# Patient Record
Sex: FEMALE | Race: WHITE | NOT HISPANIC OR LATINO | Employment: FULL TIME | URBAN - METROPOLITAN AREA
[De-identification: names, ages, dates, MRNs, and addresses within clinical notes are randomized per-mention and may not be internally consistent; named-entity substitution may affect disease eponyms.]

---

## 2023-06-07 ENCOUNTER — TELEPHONE (OUTPATIENT)
Dept: PRIMARY CARE | Facility: CLINIC | Age: 38
End: 2023-06-07
Payer: COMMERCIAL

## 2023-06-07 DIAGNOSIS — J01.00 ACUTE NON-RECURRENT MAXILLARY SINUSITIS: Primary | ICD-10-CM

## 2023-06-07 RX ORDER — AMOXICILLIN AND CLAVULANATE POTASSIUM 875; 125 MG/1; MG/1
875 TABLET, FILM COATED ORAL 2 TIMES DAILY
Qty: 14 TABLET | Refills: 0 | Status: SHIPPED | OUTPATIENT
Start: 2023-06-07 | End: 2023-06-14

## 2023-06-07 RX ORDER — FLUCONAZOLE 150 MG/1
150 TABLET ORAL ONCE
Qty: 1 TABLET | Refills: 0 | Status: SHIPPED | OUTPATIENT
Start: 2023-06-07 | End: 2023-06-07

## 2023-06-07 NOTE — TELEPHONE ENCOUNTER
Pt contacted me.   Sinus congestion and pain x 2 wk. Now w eye drainage.   Rx augmentin BID x 7 d   Diflucan in case of yeast

## 2023-06-19 ENCOUNTER — TELEPHONE (OUTPATIENT)
Dept: PRIMARY CARE | Facility: CLINIC | Age: 38
End: 2023-06-19
Payer: COMMERCIAL

## 2023-06-19 DIAGNOSIS — Z00.00 LABORATORY TESTS ORDERED AS PART OF A COMPLETE PHYSICAL EXAM (CPE): ICD-10-CM

## 2023-06-29 ENCOUNTER — APPOINTMENT (OUTPATIENT)
Dept: PRIMARY CARE | Facility: CLINIC | Age: 38
End: 2023-06-29
Payer: COMMERCIAL

## 2023-07-24 ENCOUNTER — OFFICE VISIT (OUTPATIENT)
Dept: PRIMARY CARE | Facility: CLINIC | Age: 38
End: 2023-07-24
Payer: COMMERCIAL

## 2023-07-24 VITALS
SYSTOLIC BLOOD PRESSURE: 125 MMHG | RESPIRATION RATE: 18 BRPM | WEIGHT: 182.2 LBS | HEIGHT: 61 IN | BODY MASS INDEX: 34.4 KG/M2 | HEART RATE: 70 BPM | OXYGEN SATURATION: 97 % | TEMPERATURE: 98 F | DIASTOLIC BLOOD PRESSURE: 82 MMHG

## 2023-07-24 DIAGNOSIS — J45.20 MILD INTERMITTENT ASTHMA WITHOUT COMPLICATION (HHS-HCC): ICD-10-CM

## 2023-07-24 DIAGNOSIS — Z00.00 ENCOUNTER FOR ANNUAL PHYSICAL EXAM: Primary | ICD-10-CM

## 2023-07-24 PROCEDURE — 1036F TOBACCO NON-USER: CPT | Performed by: FAMILY MEDICINE

## 2023-07-24 PROCEDURE — 99395 PREV VISIT EST AGE 18-39: CPT | Performed by: FAMILY MEDICINE

## 2023-07-24 PROCEDURE — 3008F BODY MASS INDEX DOCD: CPT | Performed by: FAMILY MEDICINE

## 2023-07-24 RX ORDER — PHENTERMINE HYDROCHLORIDE 37.5 MG/1
37.5 TABLET ORAL
Qty: 30 TABLET | Refills: 0 | Status: SHIPPED | OUTPATIENT
Start: 2023-07-24 | End: 2023-08-02 | Stop reason: SDUPTHER

## 2023-07-24 RX ORDER — ALBUTEROL SULFATE 90 UG/1
AEROSOL, METERED RESPIRATORY (INHALATION) AS NEEDED
COMMUNITY
Start: 2019-03-06 | End: 2024-02-07 | Stop reason: SDUPTHER

## 2023-07-24 RX ORDER — BUDESONIDE AND FORMOTEROL FUMARATE DIHYDRATE 80; 4.5 UG/1; UG/1
2 AEROSOL RESPIRATORY (INHALATION) 2 TIMES DAILY
Qty: 3 EACH | Refills: 3 | Status: SHIPPED | OUTPATIENT
Start: 2023-07-24 | End: 2023-07-24

## 2023-07-24 RX ORDER — LORATADINE 10 MG/1
1 TABLET ORAL AS NEEDED
COMMUNITY
Start: 2007-04-26

## 2023-07-24 RX ORDER — FLUCONAZOLE 150 MG/1
150 TABLET ORAL ONCE
COMMUNITY
Start: 2023-06-07

## 2023-07-24 NOTE — PROGRESS NOTES
"Subjective   Lety Lynn is a 37 y.o. female who presents for Annual Exam (Pt being seen for complete physical exam.).  HPI  Pap NIL HPV neg 1/2023    Here for CPE  Ex regularly and eats healthy, very frustrated that she's unable to lose weight  Mood is good, sleeping well   Fhx of colon ca in grandfather in 50-60s.   No immed Fhx breast ca   Needs RF On symbicort   Current Outpatient Medications on File Prior to Visit   Medication Sig Dispense Refill    albuterol 90 mcg/actuation inhaler Inhale if needed.      fluconazole (Diflucan) 150 mg tablet Take 1 tablet (150 mg) by mouth 1 time. FOR ONE DOSE      loratadine (Claritin) 10 mg tablet Take 1 tablet (10 mg) by mouth if needed.      [DISCONTINUED] budesonide-formoteroL (Symbicort) 80-4.5 mcg/actuation inhaler INHALE 2 PUFFS BY MOUTH TWO TIMES A DAY. RINSE MOUTH AFTER USE 10.2 g 11     No current facility-administered medications on file prior to visit.                  Objective   /82   Pulse 70   Temp 36.7 °C (98 °F)   Resp 18   Ht 1.549 m (5' 0.98\")   Wt 82.6 kg (182 lb 3.2 oz)   SpO2 97%   BMI 34.44 kg/m²    Physical Exam  General: NAD  HEENT:NCAT, PERRLA, nml OP  Neck: no cervical NADINE  Heart: RRR no murmur, no edema   Lungs: CTA b/l, no wheeze or rhonchi   GI: abd soft, nontender, nondistended.   MSK: no c/c/e. nml gait   Skin: warm and dry  Psych: cooperative, appropriate affect  Neuro: speech clear. A&Ox3  Assessment/Plan   Problem List Items Addressed This Visit    None  Visit Diagnoses       Encounter for annual physical exam    CPE:overall doing well. Discussed diet/ex changes  BMI: 34  VACC: reviewed, tdap, PNA vacc UTD   COLON CA SCRN: 45  LABS: ordered TSH. Reviewed life ins labs  PAP: UTD per GYN   MAMMO: 40  DEXA: 65      Mild intermittent asthma without complication      Stable RF symbicort x 1 yr     Relevant Medications    budesonide-formoteroL (Symbicort) 80-4.5 mcg/actuation inhaler    BMI 34.0-34.9,adult      -failed " diet/ex changes  -start phentermine 37.5 take 1/2 tab x 1 wk   -considered GLP1  but not covered under ins  -RF to nutritionist   -f.up 1 mo  -CSA today  -I have personally reviewed the OARRS report for the patient. This report is scanned into the electronic medical record. I have considered the risks of abuse, dependence, addiction and diversion.       Relevant Medications    phentermine (Adipex-P) 37.5 mg tablet    Other Relevant Orders    Referral to Nutrition Services

## 2023-07-31 ENCOUNTER — LAB (OUTPATIENT)
Dept: LAB | Facility: LAB | Age: 38
End: 2023-07-31
Payer: COMMERCIAL

## 2023-07-31 DIAGNOSIS — Z00.00 ENCOUNTER FOR ANNUAL PHYSICAL EXAM: ICD-10-CM

## 2023-07-31 PROCEDURE — 36415 COLL VENOUS BLD VENIPUNCTURE: CPT

## 2023-07-31 PROCEDURE — 84443 ASSAY THYROID STIM HORMONE: CPT

## 2023-08-01 ENCOUNTER — TELEPHONE (OUTPATIENT)
Dept: PRIMARY CARE | Facility: CLINIC | Age: 38
End: 2023-08-01
Payer: COMMERCIAL

## 2023-08-01 LAB — THYROTROPIN (MIU/L) IN SER/PLAS BY DETECTION LIMIT <= 0.05 MIU/L: 1.99 MIU/L (ref 0.44–3.98)

## 2023-08-02 RX ORDER — PHENTERMINE HYDROCHLORIDE 37.5 MG/1
37.5 TABLET ORAL
Qty: 30 TABLET | Refills: 0 | Status: SHIPPED | OUTPATIENT
Start: 2023-08-02 | End: 2023-08-30 | Stop reason: SDUPTHER

## 2023-08-02 NOTE — TELEPHONE ENCOUNTER
Spoke with Pt today and informed of lab results, Pt requested phentermine (Adipex-P) 37.5 mg tablet be sent to [facility] as [facility] does not carry this medication; medication and pharmacy pended to this encounter

## 2023-08-02 NOTE — TELEPHONE ENCOUNTER
Spoke with Pt informed of normal thyroid and to repeat in one year per Dr. Behm Pt expressed understanding.

## 2023-08-18 PROBLEM — Z86.59 HISTORY OF BULIMIA: Status: ACTIVE | Noted: 2023-08-18

## 2023-08-18 PROBLEM — L08.9 PUSTULE: Status: ACTIVE | Noted: 2023-08-18

## 2023-08-18 PROBLEM — G25.81 RESTLESS LEG SYNDROME: Status: ACTIVE | Noted: 2023-08-18

## 2023-08-18 PROBLEM — D25.9 FIBROID UTERUS: Status: ACTIVE | Noted: 2023-08-18

## 2023-08-18 PROBLEM — J30.2 SEASONAL ALLERGIES: Status: ACTIVE | Noted: 2023-08-18

## 2023-08-18 PROBLEM — J45.909 ASTHMA (HHS-HCC): Status: ACTIVE | Noted: 2023-08-18

## 2023-08-18 PROBLEM — R31.9 HEMATURIA: Status: ACTIVE | Noted: 2023-08-18

## 2023-08-18 PROBLEM — R39.9 UTI SYMPTOMS: Status: ACTIVE | Noted: 2023-08-18

## 2023-08-18 PROBLEM — N20.0 RENAL CALCULUS: Status: ACTIVE | Noted: 2023-08-18

## 2023-08-18 PROBLEM — O09.529 AMA (ADVANCED MATERNAL AGE) MULTIGRAVIDA 35+ (HHS-HCC): Status: ACTIVE | Noted: 2023-08-18

## 2023-08-18 PROBLEM — R79.89 LOW VITAMIN D LEVEL: Status: ACTIVE | Noted: 2023-08-18

## 2023-08-18 PROBLEM — F32.A DEPRESSION: Status: ACTIVE | Noted: 2023-08-18

## 2023-08-18 RX ORDER — CHOLECALCIFEROL (VITAMIN D3) 125 MCG
125 CAPSULE ORAL
COMMUNITY

## 2023-08-18 RX ORDER — ALBUTEROL SULFATE 5 MG/ML
SOLUTION RESPIRATORY (INHALATION)
COMMUNITY
Start: 2010-07-03

## 2023-08-30 ENCOUNTER — OFFICE VISIT (OUTPATIENT)
Dept: PRIMARY CARE | Facility: CLINIC | Age: 38
End: 2023-08-30
Payer: COMMERCIAL

## 2023-08-30 VITALS
HEIGHT: 61 IN | DIASTOLIC BLOOD PRESSURE: 86 MMHG | RESPIRATION RATE: 18 BRPM | HEART RATE: 82 BPM | BODY MASS INDEX: 32.7 KG/M2 | WEIGHT: 173.2 LBS | SYSTOLIC BLOOD PRESSURE: 134 MMHG | OXYGEN SATURATION: 98 % | TEMPERATURE: 98 F

## 2023-08-30 PROCEDURE — 3008F BODY MASS INDEX DOCD: CPT | Performed by: FAMILY MEDICINE

## 2023-08-30 PROCEDURE — 1036F TOBACCO NON-USER: CPT | Performed by: FAMILY MEDICINE

## 2023-08-30 PROCEDURE — 99213 OFFICE O/P EST LOW 20 MIN: CPT | Performed by: FAMILY MEDICINE

## 2023-08-30 RX ORDER — PHENTERMINE HYDROCHLORIDE 37.5 MG/1
37.5 TABLET ORAL
Qty: 30 TABLET | Refills: 0 | Status: SHIPPED | OUTPATIENT
Start: 2023-08-30 | End: 2023-09-28 | Stop reason: SDUPTHER

## 2023-08-30 NOTE — PROGRESS NOTES
"Subjective   Lety Lynn is a 37 y.o. female who presents for Follow-up (Medication follow up ).  HPI  Started phentermine last mo. Lost 9 lb. Dec appetite and portions. Seeing dietician in oct   Current Outpatient Medications on File Prior to Visit   Medication Sig Dispense Refill    albuterol 5 mg/mL nebulizer solution aerosol treatment q 4 to 6 hours as needed for cough, wheeze      albuterol 90 mcg/actuation inhaler Inhale if needed.      budesonide-formoteroL (Symbicort) 80-4.5 mcg/actuation inhaler Inhale 2 puffs 2 times a day. RINSE MOUTH AFTER USE 3 each 3    cholecalciferol (Vitamin D-3) 125 MCG (5000 UT) capsule Take 1 capsule (125 mcg) by mouth 1 (one) time per week.      fluconazole (Diflucan) 150 mg tablet Take 1 tablet (150 mg) by mouth 1 time. FOR ONE DOSE      loratadine (Claritin) 10 mg tablet Take 1 tablet (10 mg) by mouth if needed.      mometasone-formoterol (Dulera 100) 100-5 mcg/actuation inhaler Inhale 2 puffs twice a day. As instructed      [DISCONTINUED] phentermine (Adipex-P) 37.5 mg tablet Take 1 tablet (37.5 mg) by mouth once daily in the morning. Take before meals. 30 tablet 0     No current facility-administered medications on file prior to visit.                  Objective   /86   Pulse 82   Temp 36.7 °C (98 °F)   Resp 18   Ht 1.549 m (5' 0.98\")   Wt 78.6 kg (173 lb 3.2 oz)   SpO2 98%   BMI 32.74 kg/m²    Physical Exam  General: NAD  HEENT:NCAT, PERRLA  Heart: RRR no murmur, no edema   Lungs: CTA b/l, no wheeze or rhonchi   MSK: no c/c/e. nml gait   Skin: warm and dry  Psych: cooperative, appropriate affect  Neuro: speech clear. A&Ox3  Assessment/Plan   Problem List Items Addressed This Visit    None  Visit Diagnoses       BMI 34.0-34.9,adult      -making great progress w phentermine  -RF x mo #2  -CSA UTD  -f/up 1 mo  -I have personally reviewed the OARRS report for the patient. This report is scanned into the electronic medical record. I have considered the risks " of abuse, dependence, addiction and diversion.       Relevant Medications    phentermine (Adipex-P) 37.5 mg tablet

## 2023-09-28 ENCOUNTER — TELEPHONE (OUTPATIENT)
Dept: PRIMARY CARE | Facility: CLINIC | Age: 38
End: 2023-09-28
Payer: COMMERCIAL

## 2023-09-28 RX ORDER — PHENTERMINE HYDROCHLORIDE 37.5 MG/1
37.5 TABLET ORAL
Qty: 7 TABLET | Refills: 0 | Status: SHIPPED | OUTPATIENT
Start: 2023-09-28 | End: 2023-10-04 | Stop reason: SDUPTHER

## 2023-09-28 NOTE — TELEPHONE ENCOUNTER
Patient called in to get set up for apt, asked for an apt Monday, also will be out of medication on Monday, Advise?

## 2023-10-04 ENCOUNTER — OFFICE VISIT (OUTPATIENT)
Dept: PRIMARY CARE | Facility: CLINIC | Age: 38
End: 2023-10-04
Payer: COMMERCIAL

## 2023-10-04 VITALS
HEIGHT: 61 IN | HEART RATE: 81 BPM | WEIGHT: 163 LBS | DIASTOLIC BLOOD PRESSURE: 87 MMHG | SYSTOLIC BLOOD PRESSURE: 120 MMHG | TEMPERATURE: 98.2 F | RESPIRATION RATE: 18 BRPM | BODY MASS INDEX: 30.78 KG/M2 | OXYGEN SATURATION: 98 %

## 2023-10-04 DIAGNOSIS — Z23 FLU VACCINE NEED: ICD-10-CM

## 2023-10-04 PROCEDURE — 1036F TOBACCO NON-USER: CPT | Performed by: FAMILY MEDICINE

## 2023-10-04 PROCEDURE — 3008F BODY MASS INDEX DOCD: CPT | Performed by: FAMILY MEDICINE

## 2023-10-04 PROCEDURE — 90471 IMMUNIZATION ADMIN: CPT | Performed by: FAMILY MEDICINE

## 2023-10-04 PROCEDURE — 90686 IIV4 VACC NO PRSV 0.5 ML IM: CPT | Performed by: FAMILY MEDICINE

## 2023-10-04 PROCEDURE — 99213 OFFICE O/P EST LOW 20 MIN: CPT | Performed by: FAMILY MEDICINE

## 2023-10-04 RX ORDER — PHENTERMINE HYDROCHLORIDE 37.5 MG/1
37.5 TABLET ORAL
Qty: 30 TABLET | Refills: 0 | Status: SHIPPED | OUTPATIENT
Start: 2023-10-04 | End: 2023-11-03

## 2023-10-04 NOTE — PROGRESS NOTES
"Subjective   Lety Lynn is a 37 y.o. female who presents for Med Refill (Follow up on phentermine ).  HPI  Doing well on phentermine no SE, meeting w nutritionist soon to come up w proper eating plan.     Covid last week feeling better   Current Outpatient Medications on File Prior to Visit   Medication Sig Dispense Refill    albuterol 5 mg/mL nebulizer solution aerosol treatment q 4 to 6 hours as needed for cough, wheeze      albuterol 90 mcg/actuation inhaler Inhale if needed.      budesonide-formoteroL (Symbicort) 80-4.5 mcg/actuation inhaler INHALE 2 PUFFS TWO TIMES A DAY. RINSE MOUTH AFTER USE 30.6 g 3    cholecalciferol (Vitamin D-3) 125 MCG (5000 UT) capsule Take 1 capsule (125 mcg) by mouth 1 (one) time per week.      fluconazole (Diflucan) 150 mg tablet Take 1 tablet (150 mg) by mouth 1 time. FOR ONE DOSE      loratadine (Claritin) 10 mg tablet Take 1 tablet (10 mg) by mouth if needed.      mometasone-formoterol (Dulera 100) 100-5 mcg/actuation inhaler Inhale 2 puffs twice a day. As instructed      [DISCONTINUED] phentermine (Adipex-P) 37.5 mg tablet Take 1 tablet (37.5 mg) by mouth once daily in the morning. Take before meals. 30 tablet 0    [DISCONTINUED] phentermine (Adipex-P) 37.5 mg tablet Take 1 tablet (37.5 mg) by mouth once daily in the morning. Take before meals for 7 days. 7 tablet 0     No current facility-administered medications on file prior to visit.                  Objective   /87   Pulse 81   Temp 36.8 °C (98.2 °F)   Resp 18   Ht 1.549 m (5' 0.98\")   Wt 73.9 kg (163 lb)   SpO2 98%   BMI 30.81 kg/m²    Physical Exam  General: NAD  HEENT:NCAT, PERRLA  Heart: RRR no murmur, no edema   Lungs: CTA b/l, no wheeze or rhonchi   MSK: no c/c/e. nml gait   Skin: warm and dry  Psych: cooperative, appropriate affect  Neuro: speech clear. A&Ox3  Assessment/Plan   Problem List Items Addressed This Visit    None  Visit Diagnoses       Flu vaccine need        Relevant Orders    Flu " vaccine (IIV4) age 6 months and greater, preservative free (Completed)    BMI 34.0-34.9,adult      -congratulated on success w phentermine  -RF for mo #3  -lost almost 20 lb in 2 mo!  -I have personally reviewed the OARRS report for the patient. This report is scanned into the electronic medical record. I have considered the risks of abuse, dependence, addiction and diversion.   -upcoming nutritionist appt  F/up yearly or prn     Relevant Medications    phentermine (Adipex-P) 37.5 mg tablet

## 2023-10-27 ENCOUNTER — TELEMEDICINE CLINICAL SUPPORT (OUTPATIENT)
Dept: NUTRITION | Facility: CLINIC | Age: 38
End: 2023-10-27
Payer: COMMERCIAL

## 2023-10-27 VITALS — BODY MASS INDEX: 30.76 KG/M2 | HEIGHT: 61 IN | WEIGHT: 162.92 LBS

## 2023-10-27 DIAGNOSIS — E66.9 CLASS 1 OBESITY WITH BODY MASS INDEX (BMI) OF 34.0 TO 34.9 IN ADULT, UNSPECIFIED OBESITY TYPE, UNSPECIFIED WHETHER SERIOUS COMORBIDITY PRESENT: ICD-10-CM

## 2023-10-27 PROCEDURE — 97802 MEDICAL NUTRITION INDIV IN: CPT | Mod: 95 | Performed by: DIETITIAN, REGISTERED

## 2023-10-27 PROCEDURE — 97802 MEDICAL NUTRITION INDIV IN: CPT | Performed by: DIETITIAN, REGISTERED

## 2023-10-27 NOTE — PROGRESS NOTES
"Reason for Nutrition Visit:  Pt is a 38 y.o. female being seen at referred for BMI 34.0-34.9,adult (Z68.34) by  Brittany Behm, DO on Jul 24, 2023.                 1. Body mass index (BMI) of 34.0 to 34.9 in adult [Z68.34]        2. Class 1 obesity with body mass index (BMI) of 34.0 to 34.9 in adult, unspecified obesity type, unspecified whether serious comorbidity present [E66.9, Z68.34]             Past Medical Hx:  Patient Active Problem List   Diagnosis    AMA (advanced maternal age) multigravida 35+    Asthma    Depression    Fibroid uterus    Hematuria    Low vitamin D level    Renal calculus    Restless leg syndrome    Seasonal allergies    UTI symptoms    History of bulimia    Pustule        Current Outpatient Medications:     albuterol 5 mg/mL nebulizer solution, aerosol treatment q 4 to 6 hours as needed for cough, wheeze, Disp: , Rfl:     albuterol 90 mcg/actuation inhaler, Inhale if needed., Disp: , Rfl:     budesonide-formoteroL (Symbicort) 80-4.5 mcg/actuation inhaler, INHALE 2 PUFFS TWO TIMES A DAY. RINSE MOUTH AFTER USE, Disp: 30.6 g, Rfl: 3    cholecalciferol (Vitamin D-3) 125 MCG (5000 UT) capsule, Take 1 capsule (125 mcg) by mouth 1 (one) time per week., Disp: , Rfl:     fluconazole (Diflucan) 150 mg tablet, Take 1 tablet (150 mg) by mouth 1 time. FOR ONE DOSE, Disp: , Rfl:     loratadine (Claritin) 10 mg tablet, Take 1 tablet (10 mg) by mouth if needed., Disp: , Rfl:     mometasone-formoterol (Dulera 100) 100-5 mcg/actuation inhaler, Inhale 2 puffs twice a day. As instructed, Disp: , Rfl:     phentermine (Adipex-P) 37.5 mg tablet, Take 1 tablet (37.5 mg) by mouth once daily in the morning. Take before meals., Disp: 30 tablet, Rfl: 0     Anthropometrics:  Anthropometrics  Height: 154.9 cm (5' 0.98\")  Weight: 73.9 kg (162 lb 14.7 oz)  BMI (Calculated): 30.8   Weight change:    Significant Weight Change: No    Lab Results   Component Value Date    HGBA1C 5.0 05/04/2022    CHOL 207 (H) 05/04/2022    " "LDLF 136 (H) 05/04/2022    TRIG 56 05/04/2022    HDL 59.5 05/04/2022        Chemistry    Lab Results   Component Value Date/Time     05/04/2022 1218    K 4.6 05/04/2022 1218     05/04/2022 1218    CO2 28 05/04/2022 1218    BUN 20 05/04/2022 1218    CREATININE 0.87 05/04/2022 1218    Lab Results   Component Value Date/Time    CALCIUM 9.6 05/04/2022 1218    ALKPHOS 57 05/04/2022 1218    AST 14 05/04/2022 1218    ALT 14 05/04/2022 1218    BILITOT 0.5 05/04/2022 1218         Food and Nutrition Hx:  Pt has hx of bulimia nervosa. Pt reports a long hx using food as a reward and pleasure with having ideals with eat and body. She now wants to focus on healthy eating as wt has increased over the past few years. She reports \"numbers\" can trigger past thoughts. She has started to take a wt loss medication.   Pt can wakes feeling energized. Hunger may be sharp pain in the stomach; she can ignores the hunger.   Pt wakes up at 6:00 am.   She exercise 6:30- 7:00 am. 2 -3 meals are consumed per day.     24 Diet Recall:  Meal 1: Breakfast is at 8:30 am to include an overnight oats to include 3/4 cup of oats, 1 cup of almond coconut milk, 1 T of brent seeds or a banana or apple with honey (kcal 350) or a rita bar. She will eat this in small amounts all throughout the morning and this will cause her to not want to have lunch.   Meal 2: If having a Rita bar, she will consume lunch to include include tuna salad and 5 crackers (kcal 250)  Energy can drop at 4:30 -5:00 pm. Pt associates the drop in energy as an emotional state.   Meal 3: Dinner time varies to include salad with goat cheese, dried fruit, candidate nuts and light dressing or she may eats at home she can have a few bites of food. She does not at times place food on food. An exact detail of the amount of food was difficult to obtain since she just picks like she does in the morning.   Snacks:  Beverages: 3 bottles of wate ris consumed per day.     Allergies: " "None  Intolerance: unknown  Appetite: Normal. She is on a wt loss medication.   Intake: >75%  GI Symptoms : None Frequency: absent  Swallowing Difficulty: No problems with swallowing  Dentition : own    Types of Activities: Cardio Workouts/ [place]   Duration: 30 minutes a day 5 days during the week and one 30- 45 minutes session on the weekend.*   Total exercise per week is ~ 195 minutes of both moderate to vigorous exercise.     Sleep duration/quality : unknown   Sleep disorders: none    Supplements: Vitamin D daily    Feelings of Hunger?: Yes but ignores cue  Physical Feeling: Varies depending on meal consumed  Binging: Periods of restriction then overeating/did not get to this   Cravings: None/unknown  Energy Levels: Fluctuates    Food Preparation: Patient and Partner/Spouse  Cooking Skills/Barriers: None reported  Grocery Shopping: Patient and Partner/Spouse    Eating Out Type: Restaurant and Take Out  3 times per week  Convenience Foods: protein bars   3 -4  times per week    Nutrition Focused Physical Exam:    Performed/Deferred: Deferred due to be being virtual visit    Estimated Energy Needs:  Energy Needs  Calculated Energy Needs Using Equations  Height: 154.9 cm (5' 0.98\")  Weight Used for Equation Calculations: 73.9 kg (162 lb 14.7 oz)  Cassy Andrade Equation (Overweight or Obese Patients): 1351  Equation Chosen to Use by RD: Newton Matias  Activity Factor: 1.4  Total Energy Needs: 1900  Estimated Energy Needs  Total Energy Estimated Needs (kCal): 1400 kCal     Nutrition Diagnosis:    Diagnosis Statement 1:  Diagnosis Status: New  Diagnosis : Food and nutrition related knowledge deficit related to  how to eat for wt loss considering past hx of disordered eating  as evidenced by  reports by pt of the need to learn    Diagnosis Statement 2:  Diagnosis Status: New  Diagnosis : Predicted excessive energy intake  related to  lack of meal and snack schedule and structure that could reduce the metabolism " and impede wt loss   as evidenced by  24 recall shows meals that can be skipped and protein can be omitted at meals.     Diagnosis Statement 3:   Diagnosis Status: New  Diagnosis : Inadequate protein intake  related to  current eating trend were protein is generally limited during the day  as evidenced by  protein intake does not meet daily needs.     Nutrition Interventions:  Medical nutrition therapy was given for wt loss.   Nutrition Counseling: CBT  Coordination of Care: None    Nutrition Goals:  1,400 calories per day for 1 lb wt loss. Adequate protein intake of 74 grams per day. Heart healthy meal plan with a low saturated fat intake to <5 -6 % of energy (less than 10 grams of saturated fat per day), reduced intake of added sugars (<10% of total energy), 25 grams of fiber with intake of viscous fiber to 5 g/day to 10 g/day, plant sterols/stanols to 2 g/day, 1.1 gram of omega three fatty acids to reduce LDL cholesterol. Increase awareness and response to hunger and satiety cutes.     Nutrition Recommendations:  Via teach back method patient verbalized understanding of the following topics:  1) Aim to eat three times a day. Eating three meals or three times per day can increase the metabolism, this is called the thermal effect of eating. Eating three meals burns more calories than two meals consumed per day. Strive to consume breakfast within 1 -2 hours of waking to jump start the metabolism. Aim for breakfast by  lunch at 12:00- 4:00 and dinner at 6:00- 8:00 pm.  2) Strive to include protein at the meals and even snacks. Protein at meals can increase the metabolism too.  Protein at snacks can sustain energy, stabilize blood glucose, and provide more contentment. Protein foods include eggs, egg whites, cheese, nuts, nut butters, Greek yogurt, poultry, meat, fish, tofu, plant based protein powder, and/or plant based protein drinks.   3) Increase awareness of early signs of hunger and strive to respond to hunger  with eating with particular focus on doing this before lunch. Early signs of hunger may be a slight drop in energy, the mind is unfocused or unclear and it is harder to process the world.     Educational Handouts: [facility] Weight Loss & Metabolism    Hope Vu, MS, RDN, LD, MAMTA   Advanced Practice Clinical Dietitian  [email or web address]  Schedule line [phone]  Direct line [phone]     Readiness to Change : Good  Level of Understanding : Good  Anticipated Compliant : Good

## 2023-12-08 ENCOUNTER — PHARMACY VISIT (OUTPATIENT)
Dept: PHARMACY | Facility: CLINIC | Age: 38
End: 2023-12-08
Payer: COMMERCIAL

## 2023-12-08 PROCEDURE — RXMED WILLOW AMBULATORY MEDICATION CHARGE

## 2024-01-05 ENCOUNTER — TELEMEDICINE CLINICAL SUPPORT (OUTPATIENT)
Dept: NUTRITION | Facility: CLINIC | Age: 39
End: 2024-01-05
Payer: COMMERCIAL

## 2024-01-05 VITALS — WEIGHT: 160 LBS | HEIGHT: 61 IN | BODY MASS INDEX: 30.21 KG/M2

## 2024-01-05 DIAGNOSIS — E66.9 CLASS 1 OBESITY WITH BODY MASS INDEX (BMI) OF 34.0 TO 34.9 IN ADULT, UNSPECIFIED OBESITY TYPE, UNSPECIFIED WHETHER SERIOUS COMORBIDITY PRESENT: Primary | ICD-10-CM

## 2024-01-05 PROCEDURE — 97803 MED NUTRITION INDIV SUBSEQ: CPT | Performed by: DIETITIAN, REGISTERED

## 2024-01-05 NOTE — PROGRESS NOTES
"Reason for Nutrition Visit:  Pt is a 38 y.o. female being seen at referred for BMI 34.0-34.9,adult (Z68.34) by  Brittany Behm, DO on Jul 24, 2023.                1. Class 1 obesity with body mass index (BMI) of 34.0 to 34.9 in adult, unspecified obesity type, unspecified whether serious comorbidity present [E66.9, Z68.34]              Past Medical Hx:  Patient Active Problem List   Diagnosis    AMA (advanced maternal age) multigravida 35+    Asthma    Depression    Fibroid uterus    Hematuria    Low vitamin D level    Renal calculus    Restless leg syndrome    Seasonal allergies    UTI symptoms    History of bulimia    Pustule        Current Outpatient Medications:     albuterol 5 mg/mL nebulizer solution, aerosol treatment q 4 to 6 hours as needed for cough, wheeze, Disp: , Rfl:     albuterol 90 mcg/actuation inhaler, Inhale if needed., Disp: , Rfl:     budesonide-formoteroL (Symbicort) 80-4.5 mcg/actuation inhaler, INHALE 2 PUFFS TWO TIMES A DAY. RINSE MOUTH AFTER USE, Disp: 30.6 g, Rfl: 3    cholecalciferol (Vitamin D-3) 125 MCG (5000 UT) capsule, Take 1 capsule (125 mcg) by mouth 1 (one) time per week., Disp: , Rfl:     fluconazole (Diflucan) 150 mg tablet, Take 1 tablet (150 mg) by mouth 1 time. FOR ONE DOSE, Disp: , Rfl:     loratadine (Claritin) 10 mg tablet, Take 1 tablet (10 mg) by mouth if needed., Disp: , Rfl:     mometasone-formoterol (Dulera 100) 100-5 mcg/actuation inhaler, Inhale 2 puffs twice a day. As instructed, Disp: , Rfl:     phentermine (Adipex-P) 37.5 mg tablet, Take 1 tablet (37.5 mg) by mouth once daily in the morning. Take before meals., Disp: 30 tablet, Rfl: 0     Anthropometrics:  Anthropometrics  Height: 154.9 cm (5' 0.98\")  Weight: 72.6 kg (160 lb)  BMI (Calculated): 30.25   Weight: 73.9 kg (162 lb 14.7 oz)  BMI (Calculated): 30.8   Weight change:  -2.5 lbs   Significant Weight Change: No    Lab Results   Component Value Date    HGBA1C 5.0 05/04/2022    CHOL 207 (H) 05/04/2022    LDLF " "136 (H) 05/04/2022    TRIG 56 05/04/2022    HDL 59.5 05/04/2022        Chemistry    Lab Results   Component Value Date/Time     05/04/2022 1218    K 4.6 05/04/2022 1218     05/04/2022 1218    CO2 28 05/04/2022 1218    BUN 20 05/04/2022 1218    CREATININE 0.87 05/04/2022 1218    Lab Results   Component Value Date/Time    CALCIUM 9.6 05/04/2022 1218    ALKPHOS 57 05/04/2022 1218    AST 14 05/04/2022 1218    ALT 14 05/04/2022 1218    BILITOT 0.5 05/04/2022 1218         Food and Nutrition Hx:  Pt has hx of bulimia nervosa. Pt reports a long hx using food as a reward and pleasure with having ideals with eat and body. She now wants to focus on healthy eating as wt has increased over the past few years. She reports \"numbers\" can trigger past thoughts. Certain foods could trigger such as ice cream and cake. She has started to take a wt loss medication.   Pt can wakes feeling energized. Hunger may be sharp pain in the stomach; she can ignores the hunger.     Pt had a follow-up appointment. She has stopped taking the phentermine. She is down 2.5 lbs. She has been focused on making sure protein is at all meals. She feels she has better, sustained energy. She notes she has better control of intake at meals when this occurs. She does recognize when she has brain fog that this is an indicator of the need to eat. She responds to the hunger by eating something with protein. She states she can fill fullness after a meal. She reports feeling energized after eating too.      Pt wakes up at 6:00 am.   She exercise 6:30- 7:00 am. 2 -3 meals are consumed per day.     24 Diet Recall:  Meal 1: Breakfast is at 8:30 am to include an overnight oats to include 3/4 cup of oats, 1 cup of almond coconut milk, 1 T of brent seeds or a banana or apple with honey, with almond butter (kcal 400- 500) or whey protein powder with water and [place] bites/bar.   Meal 2: If having a [place] bar, she will consume lunch to include include tuna salad and " "5 crackers (kcal 250). Broth based soup or grilled chicken sandwich with sometimes a side salad.   Energy can drop at 4:30 -5:00 pm; she now has a snack.   Meal 3: Dinner time varies to include salad with goat cheese, dried fruit, candidate nuts and light dressing or she may eats at home she can have a few bites of food. She does not at times place food on food. An exact detail of the amount of food was difficult to obtain since she just picks like she does in the morning. Dinner may be a [employer] david. She had 0.5 cup of risotto, fish, and asparagus.   Snacks:  Beverages: 3 bottles of water is consumed per day.     Allergies: None  Intolerance: unknown  Appetite: Normal.   Intake: >75%  GI Symptoms : None Frequency: absent  Swallowing Difficulty: No problems with swallowing  Dentition : own    Types of Activities: Cardio Workouts/ [place]   Duration: 30 minutes a day 5 days during the week and one 30- 45 minutes session on the weekend.*   Total exercise per week is ~ 195 minutes of both moderate to vigorous exercise.     Sleep duration/quality : unknown   Sleep disorders: none    Supplements: Vitamin D daily    Feelings of Hunger?: Yes but ignores cue now she is responding to hunger   Physical Feeling: Varies depending on meal consumed  Binging: Periods of restriction then overeating/did not get to this   Cravings: None/unknown  Energy Levels: Fluctuates    Food Preparation: Patient and Partner/Spouse  Cooking Skills/Barriers: None reported  Grocery Shopping: Patient and Partner/Spouse    Eating Out Type: Restaurant and Take Out  3 times per week  Convenience Foods: protein bars   3 -4  times per week    Nutrition Focused Physical Exam:    Performed/Deferred: Deferred due to be being virtual visit    Estimated Energy Needs:  Energy Needs  Calculated Energy Needs Using Equations  Height: 154.9 cm (5' 0.98\")  Weight Used for Equation Calculations: 73.9 kg (162 lb 14.7 oz)  [place] Equation (Overweight or Obese " "Patients): 1351  Equation Chosen to Use by RD: Newton Matias  Activity Factor: 1.4  Total Energy Needs: 1900  Estimated Energy Needs  Total Energy Estimated Needs (kCal): 1400 kCal     Nutrition Diagnosis:    Diagnosis Statement 1:  Diagnosis status: Ongoing   Diagnosis : Food and nutrition related knowledge deficit related to  how to eat for wt loss considering past hx of disordered eating  as evidenced by  reports by pt of the need to learn    Diagnosis Statement 2:  Diagnosis status: Ongoing  Diagnosis : Predicted excessive energy intake  related to  lack of meal and snack schedule and structure that could reduce the metabolism and impede wt loss   as evidenced by  24 recall shows meals that can be skipped and protein can be omitted at meals.     Diagnosis Statement 3:   Diagnosis status: Improving   Diagnosis : Inadequate protein intake  related to  current eating trend were protein is generally limited during the day  as evidenced by  protein intake does not meet daily needs.     Nutrition Interventions:  Medical nutrition therapy was given for wt loss.   Nutrition Counseling: CBT  Coordination of Care: None    Nutrition Goals:  1,400 calories per day for 1 lb wt loss. Adequate protein intake of 74 grams per day. Heart healthy meal plan with a low saturated fat intake to <5 -6 % of energy (less than 10 grams of saturated fat per day), reduced intake of added sugars (<10% of total energy), 25 grams of fiber with intake of viscous fiber to 5 g/day to 10 g/day, plant sterols/stanols to 2 g/day, 1.1 gram of omega three fatty acids to reduce LDL cholesterol. Increase awareness and response to hunger and satiety cutes.     Nutrition Recommendations:  Via teach back method patient verbalized understanding of the following topics:  1) The plate method was recommended to balance and complete meals. Using a 9\" plate, recommended for 1/2 of the plate or 1.5 cups of non-starchy vegetable and suggested to consume this " first.  Recommended protein to be included but limited to 3 ounces at meals. Recommended 1/4 of the plate or 1 cup of a grain, starchy vegetable, fruit, cow or soy milk or, yogurt at meals. For more information on the plate method visit myplate.gov.   2) To start applying the plate method, consider focusing on the non-starchy vegetables. These vegetables help to provide fiber to fill the belly up to help portion the other groups.   Artichokes                          Mushrooms  Asparagus                           Okra  Tripp sprouts                       Onions  Beets                                   Parsnips  Broccoli                              Peapods  Brussel Sprouts                   Peppers  Cabbage                              Radishes  Carrots                                Salad Greens:  Cauliflower                             Endive  Celery                                     Escarole  Cucumber                               Lettuce  Eggplant                                 Lexx  Garlic                                      Spinach   Green Onions                     Sauerkraut   Green Beans                       Summer Squash  Greens:                               Tomato     Leticia                             Turnips     Kale                                  Vegetable Juice                           Mustard                            Water chestnuts    Turnip                               Wax beans  Leeks                                   Watercress  Mixed Vegetables               Zucchini     (without corn, etc).   3) Start to pay attention to the fullness during eating. If the belly starts to feel full, but not too full, then start to push the plate away.     Educational Handouts: Plate   Previous Educational Handouts: I Weight Loss & Metabolism    Hope Vu, MS, RDN, LD, MAMTA   Advanced Practice Clinical Dietitian  [email or web address]  Schedule line [phone]  Direct line  [phone]     Readiness to Change : Good  Level of Understanding : Good  Anticipated Compliant : Good

## 2024-02-02 ENCOUNTER — TELEMEDICINE CLINICAL SUPPORT (OUTPATIENT)
Dept: NUTRITION | Facility: CLINIC | Age: 39
End: 2024-02-02
Payer: COMMERCIAL

## 2024-02-02 DIAGNOSIS — E66.9 CLASS 1 OBESITY WITH BODY MASS INDEX (BMI) OF 34.0 TO 34.9 IN ADULT, UNSPECIFIED OBESITY TYPE, UNSPECIFIED WHETHER SERIOUS COMORBIDITY PRESENT: Primary | ICD-10-CM

## 2024-02-02 PROCEDURE — 97803 MED NUTRITION INDIV SUBSEQ: CPT | Performed by: DIETITIAN, REGISTERED

## 2024-02-02 NOTE — PROGRESS NOTES
"Reason for Nutrition Visit:  Pt is a 38 y.o. female being seen at referred for BMI 34.0-34.9,adult (Z68.34) by  Brittany Behm, DO on Jul 24, 2023.        1. Class 1 obesity with body mass index (BMI) of 34.0 to 34.9 in adult, unspecified obesity type, unspecified whether serious comorbidity present [E66.9, Z68.34]                   Past Medical Hx:  Patient Active Problem List   Diagnosis    AMA (advanced maternal age) multigravida 35+    Asthma    Depression    Fibroid uterus    Hematuria    Low vitamin D level    Renal calculus    Restless leg syndrome    Seasonal allergies    UTI symptoms    History of bulimia    Pustule        Current Outpatient Medications:     albuterol 5 mg/mL nebulizer solution, aerosol treatment q 4 to 6 hours as needed for cough, wheeze, Disp: , Rfl:     albuterol 90 mcg/actuation inhaler, Inhale if needed., Disp: , Rfl:     budesonide-formoteroL (Symbicort) 80-4.5 mcg/actuation inhaler, INHALE 2 PUFFS TWO TIMES A DAY. RINSE MOUTH AFTER USE, Disp: 30.6 g, Rfl: 3    cholecalciferol (Vitamin D-3) 125 MCG (5000 UT) capsule, Take 1 capsule (125 mcg) by mouth 1 (one) time per week., Disp: , Rfl:     fluconazole (Diflucan) 150 mg tablet, Take 1 tablet (150 mg) by mouth 1 time. FOR ONE DOSE, Disp: , Rfl:     loratadine (Claritin) 10 mg tablet, Take 1 tablet (10 mg) by mouth if needed., Disp: , Rfl:     mometasone-formoterol (Dulera 100) 100-5 mcg/actuation inhaler, Inhale 2 puffs twice a day. As instructed, Disp: , Rfl:     phentermine (Adipex-P) 37.5 mg tablet, Take 1 tablet (37.5 mg) by mouth once daily in the morning. Take before meals., Disp: 30 tablet, Rfl: 0     Anthropometrics:        Anthropometrics  Height: 154.9 cm (5' 0.98\")  01/05/23 Weight: 72.6 kg (160 lb)  10/2023 Weight: 73.9 kg (162 lb 14.7 oz)      Lab Results   Component Value Date    HGBA1C 5.0 05/04/2022    CHOL 207 (H) 05/04/2022    LDLF 136 (H) 05/04/2022    TRIG 56 05/04/2022    HDL 59.5 05/04/2022        Chemistry    Lab " "Results   Component Value Date/Time     05/04/2022 1218    K 4.6 05/04/2022 1218     05/04/2022 1218    CO2 28 05/04/2022 1218    BUN 20 05/04/2022 1218    CREATININE 0.87 05/04/2022 1218    Lab Results   Component Value Date/Time    CALCIUM 9.6 05/04/2022 1218    ALKPHOS 57 05/04/2022 1218    AST 14 05/04/2022 1218    ALT 14 05/04/2022 1218    BILITOT 0.5 05/04/2022 1218         Food and Nutrition Hx:  Pt has hx of bulimia nervosa. Pt reports a long hx using food as a reward and pleasure with having ideals with eat and body. She now wants to focus on healthy eating as wt has increased over the past few years. She reports \"numbers\" can trigger past thoughts. Certain foods could trigger such as ice cream and cake. She has started to take a wt loss medication.   Pt can wakes feeling energized. Hunger may be sharp pain in the stomach; she can ignores the hunger.     Pt had a follow-up appointment. She reports doing well. She has been mindful of the plate method. She has changed up dinner were she starts off the meals with vegetables. She cooks a lot of casseroles were most of it is mostly carbohydrates. Before she would have a bowl and then this would cause her to want more later. Now she will have 1 -2 cup of vegetables first, 3 ounces of protein and then 0.5 cup of carbohydrates/casserole. Now she goes back and may consume another cup of vegetables.  She eats fast. A meal may last 3 -5 minutes. She can not identify fullness at the end of dinner.   She reports more definition in her body comp.      Pt wakes up at 6:00 am.   She exercise 6:30- 7:00 am. 2 -3 meals are consumed per day.     24 Diet Recall:  Meal 1: Breakfast is at 8:30 am to include an overnight oats to include 3/4 cup of oats, 1 cup of almond coconut milk, 1 T of brent seeds or a banana or apple with honey, with almond butter (kcal 400- 500) or whey protein powder with water and [place] bites/bar.   Meal 2: If having a [place] bar, she will consume " "lunch to include include tuna salad and 5 crackers (kcal 250). Broth based soup or grilled chicken sandwich with sometimes a side salad.   Energy can drop at 4:30 -5:00 pm; she now has a snack.   Meal 3: Dinner time varies to now 1 -2 cup of vegetables, 3 ounces of protein and 0.5 cup of casserole.    Snacks:  Beverages: 3 bottles of water is consumed per day.     Allergies: None  Intolerance: unknown  Appetite: Normal.   Intake: >75%  GI Symptoms : None Frequency: absent  Swallowing Difficulty: No problems with swallowing  Dentition : own    Types of Activities: Cardio Workouts/ [place]   Duration: 30 minutes a day 5 days during the week and one 30- 45 minutes session on the weekend.*   Total exercise per week is ~ 195 minutes of both moderate to vigorous exercise.     Sleep duration/quality : unknown   Sleep disorders: none    Supplements: Vitamin D daily    Feelings of Hunger?: Yes but ignores cue now she is responding to hunger   Physical Feeling: Varies depending on meal consumed  Binging: Periods of restriction then overeating/did not get to this   Cravings: None/unknown  Energy Levels: Fluctuates    Food Preparation: Patient and Partner/Spouse  Cooking Skills/Barriers: None reported  Grocery Shopping: Patient and Partner/Spouse    Eating Out Type: Restaurant and Take Out  3 times per week  Convenience Foods: protein bars   3 -4  times per week    Nutrition Focused Physical Exam:    Performed/Deferred: Deferred due to be being virtual visit    Estimated Energy Needs:  Energy Needs  Calculated Energy Needs Using Equations  Height: 154.9 cm (5' 0.98\")  Weight Used for Equation Calculations: 73.9 kg (162 lb 14.7 oz)  [place] Equation (Overweight or Obese Patients): 1351  Equation Chosen to Use by RD: Newton Matias  Activity Factor: 1.4  Total Energy Needs: 1900  Estimated Energy Needs  Total Energy Estimated Needs (kCal): 1400 kCal     Nutrition Diagnosis:    Diagnosis Statement 1:  Diagnosis status: " Ongoing   Diagnosis : Food and nutrition related knowledge deficit related to  how to eat for wt loss considering past hx of disordered eating  as evidenced by  reports by pt of the need to learn    Diagnosis Statement 2:  Diagnosis status: Ongoing  Diagnosis : Predicted excessive energy intake  related to  lack of meal and snack schedule and structure that could reduce the metabolism and impede wt loss   as evidenced by  24 recall shows meals that can be skipped and protein can be omitted at meals.     Diagnosis Statement 3:   Diagnosis status: Improving   Diagnosis : Inadequate protein intake  related to  current eating trend were protein is generally limited during the day  as evidenced by  protein intake does not meet daily needs.     Nutrition Interventions:  Medical nutrition therapy was given for wt loss.   Nutrition Counseling: CBT  Coordination of Care: None    Nutrition Goals:  1,400 calories per day for 1 lb wt loss. Adequate protein intake of 74 grams per day. Heart healthy meal plan with a low saturated fat intake to <5 -6 % of energy (less than 10 grams of saturated fat per day), reduced intake of added sugars (<10% of total energy), 25 grams of fiber with intake of viscous fiber to 5 g/day to 10 g/day, plant sterols/stanols to 2 g/day, 1.1 gram of omega three fatty acids to reduce LDL cholesterol. Increase awareness and response to hunger and satiety cutes.     Nutrition Recommendations:  Via teach back method patient verbalized understanding of the following topics:  1) Strive to eat mindfully at one meal a day. Mindful eating is the practice of bringing attention and awareness to the act of eating in a non-judgement way. Try to keep the mind focused on eating paying attention to the taste of food and how the food feels. The mind will be distract but then try to bring the mind and focus back to eating. Enjoy eating as a way to nourish the body and mind.   2) Increase awareness of the fullness felt  during and even after eating. Fullness is a stomach that is no longer empty or the stomach has some food within the belly, or the stomach is completely full and not another bite could be taken. To help feel fullness at a meal such as dinner, strive to increase vegetables to 2-2.5 cups, then go into the rest of the plate.   3) To help feel fullness, strive to slow down eating. Use a salad folk instead of a dinner fork, drink water in between, bites, and even set a timer to help slow down eating. Also pay attention to the breathing, slow the breathing down may also slow eating down too.     Educational Handouts: Plate   Previous Educational Handouts: I Weight Loss & Metabolism    Hope Vu, MS, RDN, LD, MAMTA   Advanced Practice Clinical Dietitian  [email or web address]  Schedule line [phone]  Direct line [phone]     Readiness to Change : Good  Level of Understanding : Good  Anticipated Compliant : Good

## 2024-02-07 ENCOUNTER — TELEPHONE (OUTPATIENT)
Dept: PRIMARY CARE | Facility: CLINIC | Age: 39
End: 2024-02-07
Payer: COMMERCIAL

## 2024-02-07 DIAGNOSIS — J45.20 MILD INTERMITTENT ASTHMA WITHOUT COMPLICATION (HHS-HCC): Primary | ICD-10-CM

## 2024-02-07 RX ORDER — ALBUTEROL SULFATE 90 UG/1
2 AEROSOL, METERED RESPIRATORY (INHALATION) EVERY 6 HOURS PRN
Qty: 18 G | Refills: 2 | Status: SHIPPED | OUTPATIENT
Start: 2024-02-07

## 2024-03-01 ENCOUNTER — TELEMEDICINE CLINICAL SUPPORT (OUTPATIENT)
Dept: NUTRITION | Facility: CLINIC | Age: 39
End: 2024-03-01
Payer: COMMERCIAL

## 2024-03-01 DIAGNOSIS — E66.9 OBESITY (BMI 30.0-34.9): Primary | ICD-10-CM

## 2024-03-01 PROCEDURE — 97803 MED NUTRITION INDIV SUBSEQ: CPT | Performed by: DIETITIAN, REGISTERED

## 2024-03-01 NOTE — PROGRESS NOTES
"Reason for Nutrition Visit:  Pt is a 38 y.o. female being seen at referred for BMI 34.0-34.9,adult (Z68.34) by  Brittany Behm, DO on Jul 24, 2023.          1. Obesity (BMI 30.0-34.9) [E66.9]           Past Medical Hx:  Patient Active Problem List   Diagnosis    AMA (advanced maternal age) multigravida 35+    Asthma    Depression    Fibroid uterus    Hematuria    Low vitamin D level    Renal calculus    Restless leg syndrome    Seasonal allergies    UTI symptoms    History of bulimia    Pustule        Current Outpatient Medications:     albuterol 5 mg/mL nebulizer solution, aerosol treatment q 4 to 6 hours as needed for cough, wheeze, Disp: , Rfl:     albuterol 90 mcg/actuation inhaler, Inhale 2 puffs every 6 hours if needed for wheezing or shortness of breath., Disp: 18 g, Rfl: 2    budesonide-formoteroL (Symbicort) 80-4.5 mcg/actuation inhaler, INHALE 2 PUFFS TWO TIMES A DAY. RINSE MOUTH AFTER USE, Disp: 30.6 g, Rfl: 3    cholecalciferol (Vitamin D-3) 125 MCG (5000 UT) capsule, Take 1 capsule (125 mcg) by mouth 1 (one) time per week., Disp: , Rfl:     fluconazole (Diflucan) 150 mg tablet, Take 1 tablet (150 mg) by mouth 1 time. FOR ONE DOSE, Disp: , Rfl:     loratadine (Claritin) 10 mg tablet, Take 1 tablet (10 mg) by mouth if needed., Disp: , Rfl:     mometasone-formoterol (Dulera 100) 100-5 mcg/actuation inhaler, Inhale 2 puffs twice a day. As instructed, Disp: , Rfl:     phentermine (Adipex-P) 37.5 mg tablet, Take 1 tablet (37.5 mg) by mouth once daily in the morning. Take before meals., Disp: 30 tablet, Rfl: 0     Anthropometrics:  Anthropometrics  Height: 154.9 cm (5' 0.98\")  03/01/24 Weight: 160 lbs   01/05/24 Weight: 72.6 kg (160 lb)  10/2023 Weight: 73.9 kg (162 lb 14.7 oz)      Lab Results   Component Value Date    HGBA1C 5.0 05/04/2022    CHOL 207 (H) 05/04/2022    LDLF 136 (H) 05/04/2022    TRIG 56 05/04/2022    HDL 59.5 05/04/2022        Chemistry    Lab Results   Component Value Date/Time     " "05/04/2022 1218    K 4.6 05/04/2022 1218     05/04/2022 1218    CO2 28 05/04/2022 1218    BUN 20 05/04/2022 1218    CREATININE 0.87 05/04/2022 1218    Lab Results   Component Value Date/Time    CALCIUM 9.6 05/04/2022 1218    ALKPHOS 57 05/04/2022 1218    AST 14 05/04/2022 1218    ALT 14 05/04/2022 1218    BILITOT 0.5 05/04/2022 1218         Food and Nutrition Hx:  Pt has hx of bulimia nervosa. Pt reports a long hx using food as a reward and pleasure with having ideals with eat and body. She now wants to focus on healthy eating as wt has increased over the past few years. She reports \"numbers\" can trigger past thoughts. Certain foods could trigger such as ice cream and cake. She has started to take a wt loss medication.   Pt can wakes feeling energized. Hunger may be sharp pain in the stomach; she can ignores the hunger.     Pt had a follow-up appointment. She reports doing well. She has been trying to feeling fullness. By adding vegetables of up to 2 cups has allowed her to feel full. She eats the vegetables first, protein second and the CHO last. She has been selecting foods that provides more energy. She has been slowing down with eating and now is eating for 20 minutes.      Pt wakes up at 6:00 am.   She exercise 6:30- 7:00 am. 2 -3 meals are consumed per day.     24 Diet Recall:  Meal 1: Breakfast is at 8:30 am to include an overnight oats to include 3/4 cup of oats, 1 cup of almond coconut milk, 1 T of brent seeds or a banana or apple with honey, with almond butter (kcal 400- 500) or whey protein powder with water and [place] bites/bar.   Meal 2: If having a [place] bar, she will consume lunch to include include tuna salad and 5 crackers (kcal 250). Broth based soup or grilled chicken sandwich with sometimes a side salad.   Energy can drop at 4:30 -5:00 pm; she now has a snack.   Meal 3: Dinner time varies to now 1 -2 cup of vegetables, 3 ounces of protein and 0.5 cup of casserole.    Snacks:  Beverages: 3 " "bottles of water is consumed per day.     Allergies: None  Intolerance: unknown  Appetite: Normal.   Intake: >75%  GI Symptoms : None Frequency: absent  Swallowing Difficulty: No problems with swallowing  Dentition : own    Types of Activities: Cardio Workouts/ [place]   Duration: 30 minutes a day 5 days during the week and one 30- 45 minutes session on the weekend.*   Total exercise per week is ~ 195 minutes of both moderate to vigorous exercise.     Sleep duration/quality : unknown   Sleep disorders: none    Supplements: Vitamin D daily    Feelings of Hunger?: Yes but ignores cue now she is responding to hunger   Physical Feeling: Varies depending on meal consumed  Binging: Periods of restriction then overeating/did not get to this   Cravings: None/unknown  Energy Levels: Fluctuates    Food Preparation: Patient and Partner/Spouse  Cooking Skills/Barriers: None reported  Grocery Shopping: Patient and Partner/Spouse    Eating Out Type: Restaurant and Take Out  3 times per week  Convenience Foods: protein bars   3 -4  times per week    Nutrition Focused Physical Exam:    Performed/Deferred: Deferred due to be being virtual visit    Estimated Energy Needs:  Energy Needs  Calculated Energy Needs Using Equations  Height: 154.9 cm (5' 0.98\")  Weight Used for Equation Calculations: 73.9 kg (162 lb 14.7 oz)  Cassy Andrade Equation (Overweight or Obese Patients): 1351  Equation Chosen to Use by RD: Newton Matias  Activity Factor: 1.4  Total Energy Needs: 1900  Estimated Energy Needs  Total Energy Estimated Needs (kCal): 1400 kCal     Nutrition Diagnosis:    Diagnosis Statement 1:  Diagnosis status: Ongoing   Diagnosis : Food and nutrition related knowledge deficit related to  how to eat for wt loss considering past hx of disordered eating  as evidenced by  reports by pt of the need to learn    Diagnosis Statement 2:  Diagnosis status: Ongoing  Diagnosis : Predicted excessive energy intake  related to  lack of meal and " snack schedule and structure that could reduce the metabolism and impede wt loss   as evidenced by  24 recall shows meals that can be skipped and protein can be omitted at meals.     Diagnosis Statement 3:   Diagnosis status: Improving   Diagnosis : Inadequate protein intake  related to  current eating trend were protein is generally limited during the day  as evidenced by  protein intake does not meet daily needs.     Nutrition Interventions:  Medical nutrition therapy was given for wt loss.   Nutrition Counseling: CBT  Coordination of Care: None    Nutrition Goals:  1,400 calories per day for 1 lb wt loss. Adequate protein intake of 74 grams per day. Heart healthy meal plan with a low saturated fat intake to <5 -6 % of energy (less than 10 grams of saturated fat per day), reduced intake of added sugars (<10% of total energy), 25 grams of fiber with intake of viscous fiber to 5 g/day to 10 g/day, plant sterols/stanols to 2 g/day, 1.1 gram of omega three fatty acids to reduce LDL cholesterol. Increase awareness and response to hunger and satiety cutes.     Nutrition Recommendations:  Via teach back method patient verbalized understanding of the following topics:  1) Set aside 10 minutes 6 days a week for a general mindfulness meditation. This can help with keeping awareness and attention with not only eating but with life.  2) Prior to eating meals, try to take 3 deep reset breathes. Slowing down the breathing can help to actually slow the pace of eating.     Educational Handouts: Mindfulness Meditation   Previous handouts: Plate, DHI's Weight loss and Metabolism     Hope Vu, MS, RDN, LD, MAMTA   Advanced Practice Clinical Dietitian  [email or web address]  Schedule line [phone]  Direct line [phone]     Readiness to Change : Good  Level of Understanding : Good  Anticipated Compliant : Good

## 2024-04-05 ENCOUNTER — APPOINTMENT (OUTPATIENT)
Dept: NUTRITION | Facility: CLINIC | Age: 39
End: 2024-04-05
Payer: COMMERCIAL

## 2024-05-17 ENCOUNTER — TELEMEDICINE CLINICAL SUPPORT (OUTPATIENT)
Dept: NUTRITION | Facility: CLINIC | Age: 39
End: 2024-05-17
Payer: COMMERCIAL

## 2024-05-17 VITALS — BODY MASS INDEX: 29.64 KG/M2 | HEIGHT: 61 IN | WEIGHT: 157 LBS

## 2024-05-17 DIAGNOSIS — E66.9 OBESITY, UNSPECIFIED CLASSIFICATION, UNSPECIFIED OBESITY TYPE, UNSPECIFIED WHETHER SERIOUS COMORBIDITY PRESENT: Primary | ICD-10-CM

## 2024-05-17 PROCEDURE — 97803 MED NUTRITION INDIV SUBSEQ: CPT | Performed by: DIETITIAN, REGISTERED

## 2024-05-17 NOTE — PROGRESS NOTES
"Reason for Nutrition Visit:  Pt is a 38 y.o. female being seen at referred for BMI 34.0-34.9,adult (Z68.34) by  Brittany Behm, DO on Jul 24, 2023.          No diagnosis found.     Past Medical Hx:  Patient Active Problem List   Diagnosis    AMA (advanced maternal age) multigravida 35+ (HHS-HCC)    Asthma (HHS-HCC)    Depression    Fibroid uterus    Hematuria    Low vitamin D level    Renal calculus    Restless leg syndrome    Seasonal allergies    UTI symptoms    History of bulimia    Pustule        Current Outpatient Medications:     albuterol 5 mg/mL nebulizer solution, aerosol treatment q 4 to 6 hours as needed for cough, wheeze, Disp: , Rfl:     albuterol 90 mcg/actuation inhaler, Inhale 2 puffs every 6 hours if needed for wheezing or shortness of breath., Disp: 18 g, Rfl: 2    budesonide-formoteroL (Symbicort) 80-4.5 mcg/actuation inhaler, INHALE 2 PUFFS TWO TIMES A DAY. RINSE MOUTH AFTER USE, Disp: 30.6 g, Rfl: 3    cholecalciferol (Vitamin D-3) 125 MCG (5000 UT) capsule, Take 1 capsule (125 mcg) by mouth 1 (one) time per week., Disp: , Rfl:     fluconazole (Diflucan) 150 mg tablet, Take 1 tablet (150 mg) by mouth 1 time. FOR ONE DOSE, Disp: , Rfl:     loratadine (Claritin) 10 mg tablet, Take 1 tablet (10 mg) by mouth if needed., Disp: , Rfl:     mometasone-formoterol (Dulera 100) 100-5 mcg/actuation inhaler, Inhale 2 puffs twice a day. As instructed, Disp: , Rfl:     phentermine (Adipex-P) 37.5 mg tablet, Take 1 tablet (37.5 mg) by mouth once daily in the morning. Take before meals., Disp: 30 tablet, Rfl: 0     Anthropometrics:    Anthropometrics  Height: 154.9 cm (5' 0.98\")  Weight: 71.2 kg (157 lb)  BMI (Calculated): 29.68   Anthropometrics  Height: 154.9 cm (5' 0.98\")    05/15/24 Weight: 157 lbs NOTE: BMI is at 29  03/01/24 Weight: 160 lbs   01/05/24 Weight: 72.6 kg (160 lb)  10/2023 Weight: 73.9 kg (162 lb 14.7 oz)      Lab Results   Component Value Date    HGBA1C 5.0 05/04/2022    CHOL 207 (H) 05/04/2022 " "   LDLF 136 (H) 05/04/2022    TRIG 56 05/04/2022    HDL 59.5 05/04/2022        Chemistry    Lab Results   Component Value Date/Time     05/04/2022 1218    K 4.6 05/04/2022 1218     05/04/2022 1218    CO2 28 05/04/2022 1218    BUN 20 05/04/2022 1218    CREATININE 0.87 05/04/2022 1218    Lab Results   Component Value Date/Time    CALCIUM 9.6 05/04/2022 1218    ALKPHOS 57 05/04/2022 1218    AST 14 05/04/2022 1218    ALT 14 05/04/2022 1218    BILITOT 0.5 05/04/2022 1218         Food and Nutrition Hx:  Pt has hx of bulimia nervosa. Pt reports a long hx using food as a reward and pleasure with having ideals with eat and body. She now wants to focus on healthy eating as wt has increased over the past few years. She reports \"numbers\" can trigger past thoughts. Certain foods could trigger such as ice cream and cake. She has started to take a wt loss medication.   Pt can wakes feeling energized. Hunger may be sharp pain in the stomach; she can ignores the hunger.     Pt had a follow-up appointment. She has been doing reset breathes. She has been exercising before work. She still works on using the plate method. She starts off with adding 2 cups of vegetables first.      Pt wakes up at 6:00 am.   She exercise 6:30- 7:00 am. 3 meals are consumed per day.     24 Diet Recall:  Meal 1: Breakfast is at 8:30 am to include an overnight oats to include 3/4 cup of oats, 1 cup of almond coconut milk, 1 T of brent seeds or a banana or apple with honey, with almond butter (kcal 400- 500) or whey protein powder with water and [place] bites/bar.   Meal 2: If having a [place] bar, she will consume lunch to include include tuna salad and 5 crackers (kcal 250). Broth based soup or grilled chicken sandwich with sometimes a side salad.   Energy can drop at 4:30 -5:00 pm; she now has a snack.   Meal 3: Dinner time varies to now 1 -2 cup of vegetables, 3 ounces of protein and 0.5 cup of casserole.    Snacks:  Beverages: 3 bottles of water is " "consumed per day.     Allergies: None  Intolerance: unknown  Appetite: Normal.   Intake: >75%  GI Symptoms : None Frequency: absent  Swallowing Difficulty: No problems with swallowing  Dentition : own    Types of Activities: Cardio Workouts/ [place]   Duration: 30 minutes a day 5 days during the week and one 30- 45 minutes session on the weekend.*   Total exercise per week is ~ 195 minutes of both moderate to vigorous exercise.     Sleep duration/quality : unknown   Sleep disorders: none    Supplements: Vitamin D daily    Feelings of Hunger?: Yes but ignores cue now she is responding to hunger   Physical Feeling: Varies depending on meal consumed  Binging: Periods of restriction then overeating/did not get to this   Cravings: None/unknown  Energy Levels: Fluctuates    Food Preparation: Patient and Partner/Spouse  Cooking Skills/Barriers: None reported  Grocery Shopping: Patient and Partner/Spouse    Eating Out Type: Restaurant and Take Out  3 times per week  Convenience Foods: protein bars   3 -4  times per week    Nutrition Focused Physical Exam:    Performed/Deferred: Deferred due to be being virtual visit    Estimated Energy Needs:  Energy Needs  Calculated Energy Needs Using Equations  Height: 154.9 cm (5' 0.98\")  Weight Used for Equation Calculations: 73.9 kg (162 lb 14.7 oz)  Cassy Andrade Equation (Overweight or Obese Patients): 1351  Equation Chosen to Use by RD: Newton Matias  Activity Factor: 1.4  Total Energy Needs: 1900  Estimated Energy Needs  Total Energy Estimated Needs (kCal): 1400 kCal     Nutrition Diagnosis:    Diagnosis Statement 1:  Diagnosis status: Ongoing   Diagnosis : Food and nutrition related knowledge deficit related to  how to eat for wt loss considering past hx of disordered eating  as evidenced by  reports by pt of the need to learn    Diagnosis Statement 2:  Diagnosis status: Ongoing  Diagnosis : Predicted excessive energy intake  related to  lack of meal and snack schedule and " structure that could reduce the metabolism and impede wt loss   as evidenced by  24 recall shows meals that can be skipped and protein can be omitted at meals.     Diagnosis Statement 3:   Diagnosis status: Improving   Diagnosis : Inadequate protein intake  related to  current eating trend were protein is generally limited during the day  as evidenced by  protein intake does not meet daily needs.     Nutrition Interventions:  Medical nutrition therapy was given for wt loss.   Nutrition Counseling: CBT  Coordination of Care: None    Nutrition Goals:  1,400 calories per day for 1 lb wt loss. Adequate protein intake of 74 grams per day. Heart healthy meal plan with a low saturated fat intake to <5 -6 % of energy (less than 10 grams of saturated fat per day), reduced intake of added sugars (<10% of total energy), 25 grams of fiber with intake of viscous fiber to 5 g/day to 10 g/day, plant sterols/stanols to 2 g/day, 1.1 gram of omega three fatty acids to reduce LDL cholesterol. Increase awareness and response to hunger and satiety cutes.     Nutrition Recommendations:  Via teach back method patient verbalized understanding of the following topics:  1) A general mindfulness meditation was given today. Setting aside some time for general mindfulness meditation can help us become centered and more mindful by focusing on breathe. Attached is the general mindfulness meditation. Set a regular time to meditate with aim for 6 days a week could help increase mindfulness related to eating.     Educational Handouts: Mindfulness Meditation for the breath,   Plate, DHI's Weight loss and Metabolism     Hope Vu, MS, RDN, LD, MAMTA   Advanced Practice Clinical Dietitian  [email or web address]  Schedule line [phone]  Direct line [phone]     Readiness to Change : Good  Level of Understanding : Good  Anticipated Compliant : Good    Moderna dose 1, 2, and 3

## 2024-07-17 ENCOUNTER — TELEMEDICINE CLINICAL SUPPORT (OUTPATIENT)
Dept: NUTRITION | Facility: CLINIC | Age: 39
End: 2024-07-17
Payer: COMMERCIAL

## 2024-07-17 DIAGNOSIS — E66.9 OBESITY (BMI 30.0-34.9): Primary | ICD-10-CM

## 2024-07-17 PROCEDURE — 97803 MED NUTRITION INDIV SUBSEQ: CPT | Performed by: DIETITIAN, REGISTERED

## 2024-07-17 NOTE — PROGRESS NOTES
"Reason for Nutrition Visit:  Pt is a 38 y.o. female being seen at referred for BMI 34.0-34.9,adult (Z68.34) by  Brittany Behm, DO on Jul 24, 2023.          No diagnosis found.     Past Medical Hx:  Patient Active Problem List   Diagnosis    AMA (advanced maternal age) multigravida 35+ (HHS-HCC)    Asthma (HHS-HCC)    Depression    Fibroid uterus    Hematuria    Low vitamin D level    Renal calculus    Restless leg syndrome    Seasonal allergies    UTI symptoms    History of bulimia    Pustule        Current Outpatient Medications:     albuterol 5 mg/mL nebulizer solution, aerosol treatment q 4 to 6 hours as needed for cough, wheeze, Disp: , Rfl:     albuterol 90 mcg/actuation inhaler, Inhale 2 puffs every 6 hours if needed for wheezing or shortness of breath., Disp: 18 g, Rfl: 2    budesonide-formoteroL (Symbicort) 80-4.5 mcg/actuation inhaler, INHALE 2 PUFFS TWO TIMES A DAY. RINSE MOUTH AFTER USE, Disp: 30.6 g, Rfl: 3    cholecalciferol (Vitamin D-3) 125 MCG (5000 UT) capsule, Take 1 capsule (125 mcg) by mouth 1 (one) time per week., Disp: , Rfl:     fluconazole (Diflucan) 150 mg tablet, Take 1 tablet (150 mg) by mouth 1 time. FOR ONE DOSE, Disp: , Rfl:     loratadine (Claritin) 10 mg tablet, Take 1 tablet (10 mg) by mouth if needed., Disp: , Rfl:     mometasone-formoterol (Dulera 100) 100-5 mcg/actuation inhaler, Inhale 2 puffs twice a day. As instructed, Disp: , Rfl:     phentermine (Adipex-P) 37.5 mg tablet, Take 1 tablet (37.5 mg) by mouth once daily in the morning. Take before meals., Disp: 30 tablet, Rfl: 0     Anthropometrics:        Anthropometrics  Height: 154.9 cm (5' 0.98\")    05/15/24 Weight: 157 lbs NOTE: BMI is at 29  03/01/24 Weight: 160 lbs   01/05/24 Weight: 72.6 kg (160 lb)  10/2023 Weight: 73.9 kg (162 lb 14.7 oz)      Lab Results   Component Value Date    HGBA1C 5.0 05/04/2022    CHOL 207 (H) 05/04/2022    LDLF 136 (H) 05/04/2022    TRIG 56 05/04/2022    HDL 59.5 05/04/2022        Chemistry  " "  Lab Results   Component Value Date/Time     05/04/2022 1218    K 4.6 05/04/2022 1218     05/04/2022 1218    CO2 28 05/04/2022 1218    BUN 20 05/04/2022 1218    CREATININE 0.87 05/04/2022 1218    Lab Results   Component Value Date/Time    CALCIUM 9.6 05/04/2022 1218    ALKPHOS 57 05/04/2022 1218    AST 14 05/04/2022 1218    ALT 14 05/04/2022 1218    BILITOT 0.5 05/04/2022 1218         Food and Nutrition Hx:  Pt has hx of bulimia nervosa. Pt reports a long hx using food as a reward and pleasure with having ideals with eat and body. She now wants to focus on healthy eating as wt has increased over the past few years. She reports \"numbers\" can trigger past thoughts. Certain foods could trigger such as ice cream and cake. She has started to take a wt loss medication.   Pt can wakes feeling energized. Hunger may be sharp pain in the stomach; she can ignores the hunger.     Pt had a follow-up appointment. She has been doing reset breathes. She has been exercising before work. She still works on using the plate method. She starts off with adding 2 cups of vegetables first.      Pt wakes up at 6:00 am.   She exercise 6:30- 7:00 am. 3 meals are consumed per day.     24 Diet Recall:  Meal 1: Breakfast is at 8:30 am to include an overnight oats to include 3/4 cup of oats, 1 cup of almond coconut milk, 1 T of brent seeds or a banana or apple with honey, with almond butter (kcal 400- 500) or whey protein powder with water and [place] bites/bar.   Meal 2: If having a [place] bar, she will consume lunch to include include tuna salad and 5 crackers (kcal 250). Broth based soup or grilled chicken sandwich with sometimes a side salad.   Energy can drop at 4:30 -5:00 pm; she now has a snack.   Meal 3: Dinner time varies to now 1 -2 cup of vegetables, 3 ounces of protein and 0.5 cup of casserole.    Snacks:  Beverages: 3 bottles of water is consumed per day.     Allergies: None  Intolerance: unknown  Appetite: Normal.   Intake: " ">75%  GI Symptoms : None Frequency: absent  Swallowing Difficulty: No problems with swallowing  Dentition : own    Types of Activities: Cardio Workouts/ [place]   Duration: 30 minutes a day 5 days during the week and one 30- 45 minutes session on the weekend.*   Total exercise per week is ~ 195 minutes of both moderate to vigorous exercise.     Sleep duration/quality : unknown   Sleep disorders: none    Supplements: Vitamin D daily    Feelings of Hunger?: Yes but ignores cue now she is responding to hunger         Physical Feeling: Varies depending on meal consumed  Binging: Periods of restriction then overeating/did not get to this   Cravings: None/unknown  Energy Levels: Fluctuates    Food Preparation: Patient and Partner/Spouse  Cooking Skills/Barriers: None reported  Grocery Shopping: Patient and Partner/Spouse    Eating Out Type: Restaurant and Take Out  3 times per week  Convenience Foods: protein bars   3 -4  times per week    Nutrition Focused Physical Exam:    Performed/Deferred: Deferred due to be being virtual visit    Estimated Energy Needs:  Energy Needs  Calculated Energy Needs Using Equations  Height: 154.9 cm (5' 0.98\")  Weight Used for Equation Calculations: 73.9 kg (162 lb 14.7 oz)  Cassy Andrade Equation (Overweight or Obese Patients): 1351  Equation Chosen to Use by RD: Newton Matias  Activity Factor: 1.4  Total Energy Needs: 1900  Estimated Energy Needs  Total Energy Estimated Needs (kCal): 1400 kCal     Nutrition Diagnosis:    Diagnosis Statement 1:  Diagnosis status: Ongoing   Diagnosis : Food and nutrition related knowledge deficit related to  how to eat for wt loss considering past hx of disordered eating  as evidenced by  reports by pt of the need to learn    Diagnosis Statement 2:  Diagnosis status: Ongoing  Diagnosis : Predicted excessive energy intake  related to  lack of meal and snack schedule and structure that could reduce the metabolism and impede wt loss   as evidenced by  24 " recall shows meals that can be skipped and protein can be omitted at meals.     Diagnosis Statement 3:   Diagnosis status: Improving   Diagnosis : Inadequate protein intake  related to  current eating trend were protein is generally limited during the day  as evidenced by  protein intake does not meet daily needs.     Nutrition Interventions:  Medical nutrition therapy was given for wt loss.   Nutrition Counseling: CBT  Coordination of Care: None    Nutrition Goals:  1,400 calories per day for 1 lb wt loss. Adequate protein intake of 74 grams per day. Heart healthy meal plan with a low saturated fat intake to <5 -6 % of energy (less than 10 grams of saturated fat per day), reduced intake of added sugars (<10% of total energy), 25 grams of fiber with intake of viscous fiber to 5 g/day to 10 g/day, plant sterols/stanols to 2 g/day, 1.1 gram of omega three fatty acids to reduce LDL cholesterol. Increase awareness and response to hunger and satiety cutes.     Nutrition Recommendations:  Via teach back method patient verbalized understanding of the following topics:  1) A hunger awareness meditation and exercise were given today. Use the hunger scale to help recognize and respond to a moderate hunger level, observe when food is consumed without the presence of hunger, and recognize when we have become too hungry. A hunger meditation was also sent with aim to listen to this meditation six days a week to help increase mindfulness to hunger cues.     Educational Handouts/Exercise: Hunger Awareness Meditation and Scale   Mindfulness Meditation for the breath,   Plate, DHI's Weight loss and Metabolism     Next session: Physical Fullness exercise (bring 16 ounces of water)     Hope Vu, MS, RDN, LD, FAND   Advanced Practice Clinical Dietitian  [email or web address]  Schedule line [phone]  Direct line [phone]     Readiness to Change : Good  Level of Understanding : Good  Anticipated Compliant : Good

## 2024-09-11 ENCOUNTER — TELEMEDICINE CLINICAL SUPPORT (OUTPATIENT)
Dept: NUTRITION | Facility: CLINIC | Age: 39
End: 2024-09-11
Payer: COMMERCIAL

## 2024-09-11 ENCOUNTER — OFFICE VISIT (OUTPATIENT)
Dept: PRIMARY CARE | Facility: CLINIC | Age: 39
End: 2024-09-11
Payer: COMMERCIAL

## 2024-09-11 VITALS
SYSTOLIC BLOOD PRESSURE: 112 MMHG | HEIGHT: 61 IN | RESPIRATION RATE: 16 BRPM | WEIGHT: 171.4 LBS | DIASTOLIC BLOOD PRESSURE: 78 MMHG | TEMPERATURE: 98.2 F | BODY MASS INDEX: 32.36 KG/M2 | HEART RATE: 76 BPM | OXYGEN SATURATION: 96 %

## 2024-09-11 DIAGNOSIS — Z00.00 ANNUAL PHYSICAL EXAM: ICD-10-CM

## 2024-09-11 DIAGNOSIS — E55.9 VITAMIN D DEFICIENCY: ICD-10-CM

## 2024-09-11 PROCEDURE — 99213 OFFICE O/P EST LOW 20 MIN: CPT | Performed by: FAMILY MEDICINE

## 2024-09-11 PROCEDURE — 3008F BODY MASS INDEX DOCD: CPT | Performed by: FAMILY MEDICINE

## 2024-09-11 PROCEDURE — 1036F TOBACCO NON-USER: CPT | Performed by: FAMILY MEDICINE

## 2024-09-11 PROCEDURE — 97803 MED NUTRITION INDIV SUBSEQ: CPT | Performed by: DIETITIAN, REGISTERED

## 2024-09-11 RX ORDER — PHENTERMINE AND TOPIRAMATE 3.75; 23 MG/1; MG/1
1 CAPSULE, EXTENDED RELEASE ORAL DAILY
Qty: 30 CAPSULE | Refills: 2 | Status: SHIPPED | OUTPATIENT
Start: 2024-09-11 | End: 2024-12-10

## 2024-09-11 NOTE — PROGRESS NOTES
"Subjective   Lety Lynn is a 38 y.o. female who presents for New Med Request (qsymia).  HPI    Interested in wt mgnt options. Did well on phentermine last yr.   Meeting w nutritionist w out progress that she would want   Current Outpatient Medications on File Prior to Visit   Medication Sig Dispense Refill    budesonide-formoteroL (Symbicort) 80-4.5 mcg/actuation inhaler INHALE 2 PUFFS TWO TIMES A DAY. RINSE MOUTH AFTER USE 30.6 g 3    levonorgestrel (Mirena) 21 mcg/24 hr (8 yrs) 52 mg IUD 52 mg by intrauterine route 1 time. Placed 2021      albuterol 5 mg/mL nebulizer solution aerosol treatment q 4 to 6 hours as needed for cough, wheeze      albuterol 90 mcg/actuation inhaler Inhale 2 puffs every 6 hours if needed for wheezing or shortness of breath. (Patient not taking: Reported on 9/11/2024) 18 g 2    cholecalciferol (Vitamin D-3) 125 MCG (5000 UT) capsule Take 1 capsule (125 mcg) by mouth 1 (one) time per week.      fluconazole (Diflucan) 150 mg tablet Take 1 tablet (150 mg) by mouth 1 time. FOR ONE DOSE      loratadine (Claritin) 10 mg tablet Take 1 tablet (10 mg) by mouth if needed.      mometasone-formoterol (Dulera 100) 100-5 mcg/actuation inhaler Inhale 2 puffs twice a day. As instructed      phentermine (Adipex-P) 37.5 mg tablet Take 1 tablet (37.5 mg) by mouth once daily in the morning. Take before meals. 30 tablet 0     No current facility-administered medications on file prior to visit.                  Objective   /78 (BP Location: Left arm)   Pulse 76   Temp 36.8 °C (98.2 °F)   Resp 16   Ht 1.549 m (5' 0.98\")   Wt 77.7 kg (171 lb 6.4 oz)   SpO2 96%   BMI 32.41 kg/m²    Physical Exam  General: NAD  HEENT:NCAT, PERRLA, nml OP  Neck: no cervical NADINE  Heart: RRR no murmur, no edema   Lungs: CTA b/l, no wheeze or rhonchi   GI: abd soft, nontender, nondistended.   MSK: no c/c/e. nml gait   Skin: warm and dry  Psych: cooperative, appropriate affect  Neuro: speech clear. " A&Ox3  Assessment/Plan   Problem List Items Addressed This Visit    None  Visit Diagnoses       BMI 34.0-34.9,adult    -  Primary  Completed phentermine course last yr but d/t wt gain would benefit from ongoing mgnt  Start qsymia   Call in 1 mo w vitals and can titrate dose as tolerated   CSA today   I have personally reviewed the OARRS report for the patient. This report is scanned into the electronic medical record. I have considered the risks of abuse, dependence, addiction and diversion.       Relevant Medications    phentermine-topiramate (Qsymia) 3.75-23 mg capsule, ER multiphase 24 hr    Annual physical exam      CPE dec labs prior     Relevant Orders    Comprehensive Metabolic Panel    CBC and Auto Differential    TSH with reflex to Free T4 if abnormal    Lipid Panel    Hemoglobin A1C    Vitamin D deficiency      Chek vit d w CPE labs     Relevant Orders    Vitamin D 25 hydroxy

## 2024-09-11 NOTE — PROGRESS NOTES
"Reason for Nutrition Visit:  Pt is a 38 y.o. female being seen at referred for BMI 34.0-34.9,adult (Z68.34) by  Brittany Behm, DO on 09/12/24.       1. Body mass index 34.0-34.9, adult [Z68.34]           Past Medical Hx:  Patient Active Problem List   Diagnosis    AMA (advanced maternal age) multigravida 35+ ([facility])    Asthma ([facility])    Depression    Fibroid uterus    Hematuria    Low vitamin D level    Renal calculus    Restless leg syndrome    Seasonal allergies    UTI symptoms    History of bulimia    Pustule        Current Outpatient Medications:     albuterol 5 mg/mL nebulizer solution, aerosol treatment q 4 to 6 hours as needed for cough, wheeze, Disp: , Rfl:     albuterol 90 mcg/actuation inhaler, Inhale 2 puffs every 6 hours if needed for wheezing or shortness of breath. (Patient not taking: Reported on 9/11/2024), Disp: 18 g, Rfl: 2    budesonide-formoteroL (Symbicort) 80-4.5 mcg/actuation inhaler, INHALE 2 PUFFS TWO TIMES A DAY. RINSE MOUTH AFTER USE, Disp: 30.6 g, Rfl: 3    cholecalciferol (Vitamin D-3) 125 MCG (5000 UT) capsule, Take 1 capsule (125 mcg) by mouth 1 (one) time per week., Disp: , Rfl:     fluconazole (Diflucan) 150 mg tablet, Take 1 tablet (150 mg) by mouth 1 time. FOR ONE DOSE, Disp: , Rfl:     levonorgestrel (Mirena) 21 mcg/24 hr (8 yrs) 52 mg IUD, 52 mg by intrauterine route 1 time. Placed 2021, Disp: , Rfl:     loratadine (Claritin) 10 mg tablet, Take 1 tablet (10 mg) by mouth if needed., Disp: , Rfl:     mometasone-formoterol (Dulera 100) 100-5 mcg/actuation inhaler, Inhale 2 puffs twice a day. As instructed, Disp: , Rfl:     phentermine (Adipex-P) 37.5 mg tablet, Take 1 tablet (37.5 mg) by mouth once daily in the morning. Take before meals., Disp: 30 tablet, Rfl: 0    phentermine-topiramate (Qsymia) 3.75-23 mg capsule, ER multiphase 24 hr, Take 1 capsule by mouth once daily., Disp: 30 capsule, Rfl: 2     Anthropometrics:  Anthropometrics  Height: 154.9 cm (5' 0.98\")  09/2024 " "Weight: 171 (in MD office)   07/2024 Weight: not obtained   05/15/24 Weight: 157 lbs NOTE: BMI is at 29  03/01/24 Weight: 160 lbs   01/05/24 Weight: 72.6 kg (160 lb)  10/2023 Weight: 73.9 kg (162 lb 14.7 oz)    Lab Results   Component Value Date    HGBA1C 5.0 05/04/2022    CHOL 207 (H) 05/04/2022    LDLF 136 (H) 05/04/2022    TRIG 56 05/04/2022    HDL 59.5 05/04/2022        Chemistry    Lab Results   Component Value Date/Time     05/04/2022 1218    K 4.6 05/04/2022 1218     05/04/2022 1218    CO2 28 05/04/2022 1218    BUN 20 05/04/2022 1218    CREATININE 0.87 05/04/2022 1218    Lab Results   Component Value Date/Time    CALCIUM 9.6 05/04/2022 1218    ALKPHOS 57 05/04/2022 1218    AST 14 05/04/2022 1218    ALT 14 05/04/2022 1218    BILITOT 0.5 05/04/2022 1218         Food and Nutrition Hx:  Pt has hx of bulimia nervosa. Pt reports a long hx using food as a reward and pleasure with having ideals with eat and body. She now wants to focus on healthy eating as wt has increased over the past few years. She reports \"numbers\" can trigger past thoughts. Certain foods could trigger such as ice cream and cake. She has started to take a wt loss medication.   Pt can wakes feeling energized. Hunger may be sharp pain in the stomach; she can ignores the hunger.     Pt had a follow-up appointment. She has been more mindful of being hunger. She now reflects on the level of hunger. She has been seen by practitioner today for a weight loss medication; her weight has increased but the the weight was not discussed at this apt.     Pt wakes up at 6:00 am.   She exercise 6:30- 7:00 am. 3 meals are consumed per day.     24 Diet Recall:  Meal 1: Breakfast is at 8:30 am to include an overnight oats to include 3/4 cup of oats, 1 cup of almond coconut milk, 1 T of brent seeds or a banana or apple with honey, with almond butter (kcal 400- 500) or whey protein powder with water and Jaylen bites/bar.   Meal 2: If having a Jaylen bar, she " "will consume lunch to include include tuna salad and 5 crackers (kcal 250). Broth based soup or grilled chicken sandwich with sometimes a side salad.   Energy can drop at 4:30 -5:00 pm; she now has a snack.   Meal 3: Dinner time varies to now 1 -2 cup of vegetables, 3 ounces of protein and 0.5 cup of casserole.    Snacks:  Beverages: 3 bottles of water is consumed per day.     Allergies: None  Intolerance: unknown  Appetite: Normal.   Intake: >75%  GI Symptoms : None Frequency: absent  Swallowing Difficulty: No problems with swallowing  Dentition : own    Types of Activities: Cardio Workouts/ [place]   Duration: 30 minutes a day 5 days during the week and one 30- 45 minutes session on the weekend.*   Total exercise per week is ~ 195 minutes of both moderate to vigorous exercise.     Sleep duration/quality : unknown   Sleep disorders: none    Supplements: Vitamin D daily    Feelings of Hunger?: Yes but ignores cue now she is responding to hunger         Physical Feeling: Varies depending on meal consumed.           Binging: Periods of restriction then overeating/did not get to this   Cravings: None/unknown  Energy Levels: Fluctuates    Food Preparation: Patient and Partner/Spouse  Cooking Skills/Barriers: None reported  Grocery Shopping: Patient and Partner/Spouse    Eating Out Type: Restaurant and Take Out  3 times per week  Convenience Foods: protein bars   3 -4  times per week    Nutrition Focused Physical Exam:    Performed/Deferred: Deferred due to be being virtual visit    Estimated Energy Needs:  Energy Needs  Calculated Energy Needs Using Equations  Height: 154.9 cm (5' 0.98\")  Weight Used for Equation Calculations: 73.9 kg (162 lb 14.7 oz)  Cassy Andrade Equation (Overweight or Obese Patients): 1351  Equation Chosen to Use by RD: Newton Matias  Activity Factor: 1.4  Total Energy Needs: 1900  Estimated Energy Needs  Total Energy Estimated Needs (kCal): 1400 kCal     Nutrition Diagnosis:    Diagnosis " Statement 1:  Diagnosis status: Ongoing   Diagnosis : Food and nutrition related knowledge deficit related to  how to eat for wt loss considering past hx of disordered eating  as evidenced by  reports by pt of the need to learn    Diagnosis Statement 2:  Diagnosis status: Ongoing  Diagnosis : Predicted excessive energy intake  related to  lack of meal and snack schedule and structure that could reduce the metabolism and impede wt loss   as evidenced by  24 recall shows meals that can be skipped and protein can be omitted at meals.     Diagnosis Statement 3:   Diagnosis status: Improving   Diagnosis : Inadequate protein intake  related to  current eating trend were protein is generally limited during the day  as evidenced by  protein intake does not meet daily needs.     Nutrition Interventions:  Medical nutrition therapy was given for wt loss.   Nutrition Counseling: CBT  Coordination of Care: None    Nutrition Goals:  1,400 calories per day for 1 lb wt loss. Adequate protein intake of 74 grams per day. Heart healthy meal plan with a low saturated fat intake to <5 -6 % of energy (less than 10 grams of saturated fat per day), reduced intake of added sugars (<10% of total energy), 25 grams of fiber with intake of viscous fiber to 5 g/day to 10 g/day, plant sterols/stanols to 2 g/day, 1.1 gram of omega three fatty acids to reduce LDL cholesterol. Increase awareness and response to hunger and satiety cutes.     Nutrition Recommendations:  Via teach back method patient verbalized understanding of the following topics:  1)Strive to pay attention to your degree of fullness when eating. Strive to identify when level 6 occurs to see if this could be an opportunity to stop eating.   Continue to set regular time to meditate;  aim for 5 days a week of the fullness satiety meditation.       Educational Handouts/Exercise: Fullness Awareness exercise   Hunger Awareness Meditation and Scale   Mindfulness Meditation for the breath,    Plate, DHI's Weight loss and Metabolism     Next session: taste satiety     Hope Vu, MS, RDN, LD, MAMTA   Advanced Practice Clinical Dietitian  [email or web address]  Schedule line [phone]  Direct line [phone]     Readiness to Change : Good  Level of Understanding : Good  Anticipated Compliant : Good

## 2024-09-12 ENCOUNTER — TELEPHONE (OUTPATIENT)
Dept: PRIMARY CARE | Facility: CLINIC | Age: 39
End: 2024-09-12
Payer: COMMERCIAL

## 2024-09-12 NOTE — TELEPHONE ENCOUNTER
----- Message from Hope ALY sent at 2024  5:12 PM EDT -----  Regarding: nutrition referral needed  [facility] Dr. Behm    Can you submit another nutrition referral? The current one has .     Thank you   Hope Vu, MS, RDN, LD, FAND   Advanced Practice Clinical Dietitian

## 2024-10-16 ENCOUNTER — APPOINTMENT (OUTPATIENT)
Dept: NUTRITION | Facility: CLINIC | Age: 39
End: 2024-10-16
Payer: COMMERCIAL

## 2024-10-28 ENCOUNTER — OFFICE VISIT (OUTPATIENT)
Dept: OBSTETRICS AND GYNECOLOGY | Facility: CLINIC | Age: 39
End: 2024-10-28
Payer: COMMERCIAL

## 2024-10-28 VITALS — DIASTOLIC BLOOD PRESSURE: 80 MMHG | BODY MASS INDEX: 31.76 KG/M2 | WEIGHT: 168 LBS | SYSTOLIC BLOOD PRESSURE: 120 MMHG

## 2024-10-28 DIAGNOSIS — Z12.31 ENCOUNTER FOR SCREENING MAMMOGRAM FOR MALIGNANT NEOPLASM OF BREAST: ICD-10-CM

## 2024-10-28 DIAGNOSIS — D25.9 UTERINE LEIOMYOMA, UNSPECIFIED LOCATION: Primary | ICD-10-CM

## 2024-10-28 DIAGNOSIS — Z01.419 WELL WOMAN EXAM WITH ROUTINE GYNECOLOGICAL EXAM: ICD-10-CM

## 2024-10-28 PROCEDURE — 1036F TOBACCO NON-USER: CPT | Performed by: OBSTETRICS & GYNECOLOGY

## 2024-10-28 PROCEDURE — 99396 PREV VISIT EST AGE 40-64: CPT | Performed by: OBSTETRICS & GYNECOLOGY

## 2024-10-29 ENCOUNTER — HOSPITAL ENCOUNTER (OUTPATIENT)
Dept: RADIOLOGY | Facility: HOSPITAL | Age: 39
Discharge: HOME | End: 2024-10-29
Payer: COMMERCIAL

## 2024-10-29 DIAGNOSIS — D25.9 UTERINE LEIOMYOMA, UNSPECIFIED LOCATION: ICD-10-CM

## 2024-10-29 PROCEDURE — 76856 US EXAM PELVIC COMPLETE: CPT | Performed by: RADIOLOGY

## 2024-10-29 PROCEDURE — 76830 TRANSVAGINAL US NON-OB: CPT | Performed by: RADIOLOGY

## 2024-10-29 PROCEDURE — 76856 US EXAM PELVIC COMPLETE: CPT

## 2024-10-30 ENCOUNTER — HOSPITAL ENCOUNTER (OUTPATIENT)
Dept: RADIOLOGY | Facility: HOSPITAL | Age: 39
Discharge: HOME | End: 2024-10-30
Payer: COMMERCIAL

## 2024-10-30 VITALS — BODY MASS INDEX: 31.91 KG/M2 | WEIGHT: 169 LBS | HEIGHT: 61 IN

## 2024-10-30 DIAGNOSIS — Z12.31 ENCOUNTER FOR SCREENING MAMMOGRAM FOR MALIGNANT NEOPLASM OF BREAST: ICD-10-CM

## 2024-10-30 PROCEDURE — 77067 SCR MAMMO BI INCL CAD: CPT | Performed by: RADIOLOGY

## 2024-10-30 PROCEDURE — 77067 SCR MAMMO BI INCL CAD: CPT

## 2024-10-30 PROCEDURE — 77063 BREAST TOMOSYNTHESIS BI: CPT | Performed by: RADIOLOGY

## 2024-11-01 DIAGNOSIS — D25.2 SUBSEROUS LEIOMYOMA OF UTERUS: Primary | ICD-10-CM

## 2024-11-05 DIAGNOSIS — D25.9 UTERINE LEIOMYOMA, UNSPECIFIED LOCATION: Primary | ICD-10-CM

## 2024-11-12 NOTE — PROGRESS NOTES
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 Ordonez for large anterior fibroid causing significant bulk symptoms.     She states fibroid was present, but very small in 2016. They have grown slowly over the years. While she is amenorrheic on Mirena, she has begun to have significant bulk symptoms including pelvic pressure and difficulty emptying her bladder.     She has a Mirena IUD, which was placed in  following . She is confident she is done w/ fertility. She has considered UAE, myomectomy, and hysterectomy and is most interested in hysterectomy for definitive management.     Imaging:   - Pelvic US 10/30/24 showed uterus measuring 12cm x 3cm x 8cm. EMS 1cm. Fibroid noted in JOY, measuring 11cm x 11cm x 10cm (on US 2023, was 9cm x 0.4cm x 6m). Bilateral ovaries WNL. IUD in place.   Labs:   - A1C  5%  - TSH  WNL   Screening:   - Last pap 2023 negative/negative, no hx of CIN2-3  - Last mammogram 10/2024 BIRADS 1  PMHx: Moderate persistent asthma (well controlled), remote hx of eating disorder in long remission   PSHx: Thorp teeth, tonsils + adenoids    PAST MEDICAL HISTORY:  Past Medical History:   Diagnosis Date    Allergy status to unspecified drugs, medicaments and biological substances     History of seasonal allergies    Encounter for full-term uncomplicated delivery      (spontaneous vaginal delivery)    Unspecified asthma, uncomplicated ([facility]) 2022    Asthma    Varicella without complication     Varicella without complication       PAST SURGICAL HISTORY:  Past Surgical History:   Procedure Laterality Date    MOUTH SURGERY  10/18/2016    Oral Surgery Tooth Extraction    OTHER SURGICAL HISTORY  2022    Thorp tooth extraction    TONSILLECTOMY  10/18/2016     Tonsillectomy With Adenoidectomy       PHYSICAL EXAMINATION: Virtual Visit   Constitutional:  No acute distress, well-nourished and well-developed  HEENT: EOM grossly intact, MMM, neck supple and with full ROM  Pulm:  Effort normal. No accessory muscle usage.  No respiratory distress.  Neurological:  She is alert and oriented to person place and time.  Skin: Warm, no pallor.  Psychiatric:  She has normal mood and affect.    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 Ordonez for large anterior fibroid causing significant bulk symptoms.   - We discussed the standard procedure for laparoscopic hysterectomy. We reviewed the risks/benefits/alternatives to surgery. She is aware of the risk of bleeding, infection, and damage to nearby structures, including bladder, ureters, bowel, and vasculature. She is agreeable to blood transfusion if recommended. We reviewed the small risk of laparotomy and the fact that fertility following hysterectomy is not an option.     PLAN:  - She has MRI scheduled in a few days. Will plan to make determination of laparoscopic vs robotic pending delineation of cervical planes on MRI. Place case request after imaging. PAT not needed.   - She is ok w/ trainees, including residents and med students   - She has upcoming appt w/ PCP, planning for labs including CBC, CMP, A1C at that time     Aurora Davis MD  24  11:53 AM

## 2024-11-13 ENCOUNTER — APPOINTMENT (OUTPATIENT)
Dept: OBSTETRICS AND GYNECOLOGY | Facility: CLINIC | Age: 39
End: 2024-11-13
Payer: COMMERCIAL

## 2024-11-13 DIAGNOSIS — D21.9 FIBROIDS: Primary | ICD-10-CM

## 2024-11-13 PROCEDURE — 99204 OFFICE O/P NEW MOD 45 MIN: CPT | Performed by: STUDENT IN AN ORGANIZED HEALTH CARE EDUCATION/TRAINING PROGRAM

## 2024-11-16 ENCOUNTER — HOSPITAL ENCOUNTER (OUTPATIENT)
Dept: RADIOLOGY | Facility: HOSPITAL | Age: 39
Discharge: HOME | End: 2024-11-16
Payer: COMMERCIAL

## 2024-11-16 DIAGNOSIS — D25.2 SUBSEROUS LEIOMYOMA OF UTERUS: ICD-10-CM

## 2024-11-16 PROCEDURE — 72197 MRI PELVIS W/O & W/DYE: CPT

## 2024-11-16 PROCEDURE — A9575 INJ GADOTERATE MEGLUMI 0.1ML: HCPCS | Performed by: OBSTETRICS & GYNECOLOGY

## 2024-11-16 PROCEDURE — 2550000001 HC RX 255 CONTRASTS: Performed by: OBSTETRICS & GYNECOLOGY

## 2024-11-16 PROCEDURE — 72197 MRI PELVIS W/O & W/DYE: CPT | Performed by: STUDENT IN AN ORGANIZED HEALTH CARE EDUCATION/TRAINING PROGRAM

## 2024-11-16 RX ORDER — GADOTERATE MEGLUMINE 376.9 MG/ML
0.2 INJECTION INTRAVENOUS
Status: COMPLETED | OUTPATIENT
Start: 2024-11-16 | End: 2024-11-16

## 2024-11-16 RX ADMIN — GADOTERATE MEGLUMINE 14 ML: 376.9 INJECTION INTRAVENOUS at 12:18

## 2024-11-16 ASSESSMENT — ENCOUNTER SYMPTOMS
OCCASIONAL FEELINGS OF UNSTEADINESS: 0
DEPRESSION: 0
LOSS OF SENSATION IN FEET: 0

## 2024-11-20 ENCOUNTER — PREP FOR PROCEDURE (OUTPATIENT)
Dept: OBSTETRICS AND GYNECOLOGY | Facility: HOSPITAL | Age: 39
End: 2024-11-20

## 2024-11-20 ENCOUNTER — APPOINTMENT (OUTPATIENT)
Dept: NUTRITION | Facility: CLINIC | Age: 39
End: 2024-11-20
Payer: COMMERCIAL

## 2024-11-20 DIAGNOSIS — D21.9 FIBROID: Primary | ICD-10-CM

## 2024-11-20 RX ORDER — CELECOXIB 200 MG/1
400 CAPSULE ORAL ONCE
OUTPATIENT
Start: 2024-11-20 | End: 2024-11-20

## 2024-11-20 RX ORDER — METRONIDAZOLE 500 MG/100ML
500 INJECTION, SOLUTION INTRAVENOUS ONCE
OUTPATIENT
Start: 2024-11-20 | End: 2024-11-20

## 2024-11-20 RX ORDER — GABAPENTIN 600 MG/1
600 TABLET ORAL ONCE
OUTPATIENT
Start: 2024-11-20 | End: 2024-11-20

## 2024-11-20 RX ORDER — ACETAMINOPHEN 325 MG/1
975 TABLET ORAL ONCE
OUTPATIENT
Start: 2024-11-20 | End: 2024-11-20

## 2024-11-20 NOTE — PROGRESS NOTES
"Reason for Nutrition Visit:  Pt is a 39 y.o. female being seen at referred for BMI 34.0-34.9,adult (Z68.34) by  Brittany Behm, DO on 09/12/24.       1. Body mass index 34.0-34.9, adult [Z68.34]             Past Medical Hx:  Patient Active Problem List   Diagnosis    AMA (advanced maternal age) multigravida 35+ ([facility])    Asthma    Depression    Fibroid uterus    Hematuria    Low vitamin D level    Renal calculus    Restless leg syndrome    Seasonal allergies    UTI symptoms    History of bulimia    Pustule        Current Outpatient Medications:     budesonide-formoteroL (Symbicort) 80-4.5 mcg/actuation inhaler, INHALE 2 PUFFS TWO TIMES A DAY. RINSE MOUTH AFTER USE, Disp: 30.6 g, Rfl: 3    cetirizine HCl (ZYRTEC ORAL), Take by mouth., Disp: , Rfl:     cholecalciferol (Vitamin D-3) 125 MCG (5000 UT) capsule, Take 1 capsule (125 mcg) by mouth 1 (one) time per week., Disp: , Rfl:     levonorgestrel (Mirena) 21 mcg/24 hr (8 yrs) 52 mg IUD, 52 mg by intrauterine route 1 time. Placed 2021, Disp: , Rfl:     mometasone-formoterol (Dulera 100) 100-5 mcg/actuation inhaler, Inhale 2 puffs twice a day. As instructed, Disp: , Rfl:     phentermine-topiramate (Qsymia) 3.75-23 mg capsule, ER multiphase 24 hr, Take 1 capsule by mouth once daily., Disp: 30 capsule, Rfl: 2     Anthropometrics:  Anthropometrics  Height: 154.9 cm (5' 0.98\")  09/2024 Weight: 171 (in MD office)   05/15/24 Weight: 157 lbs   11/2024 not discussed     Lab Results   Component Value Date    HGBA1C 5.0 05/04/2022    CHOL 207 (H) 05/04/2022    LDLF 136 (H) 05/04/2022    TRIG 56 05/04/2022    HDL 59.5 05/04/2022        Chemistry    Lab Results   Component Value Date/Time     05/04/2022 1218    K 4.6 05/04/2022 1218     05/04/2022 1218    CO2 28 05/04/2022 1218    BUN 20 05/04/2022 1218    CREATININE 0.87 05/04/2022 1218    Lab Results   Component Value Date/Time    CALCIUM 9.6 05/04/2022 1218    ALKPHOS 57 05/04/2022 1218    AST 14 05/04/2022 1218    " "ALT 14 05/04/2022 1218    BILITOT 0.5 05/04/2022 1218         Food and Nutrition Hx:  Pt has hx of bulimia nervosa. Pt reports a long hx using food as a reward and pleasure with having ideals with eat and body. She now wants to focus on healthy eating as wt has increased over the past few years. She reports \"numbers\" can trigger past thoughts such as kcals. Certain foods could trigger such as ice cream and cake. She has started to take a wt loss medication.   Pt can wakes feeling energized. Hunger may be sharp pain in the stomach; she can ignores the hunger.     Pt had a follow-up appointment. Pt has been noticing she has become chavez quicker. She has been stopping to eat when she feels this. She has been focusing on protein at meals and then adding CHO.      Pt wakes up at 6:00 am.   She exercise 6:30- 7:00 am. 3 meals are consumed per day.     24 Diet Recall:  Meal 1: Breakfast is at 8:30 am to include an overnight oats to include 3/4 cup of oats, 1 cup of almond coconut milk, 1 T of brent seeds or a banana or apple with honey, with almond butter (kcal 400- 500) or whey protein powder with water and Jaylen bites/bar. Protein Smoothie is consumed. 1 egg with   Meal 2: If having a [place] bar, she will consume lunch to include include tuna salad and 5 crackers (kcal 250). Broth based soup or grilled chicken sandwich with sometimes a side salad.   Energy can drop at 4:30 -5:00 pm; she now has a snack.   Meal 3: Dinner time varies to now 1 -2 cup of vegetables, 3 ounces of protein and 0.5 cup of casserole.    Snacks:  Beverages: 3 bottles of water is consumed per day.     Allergies: None  Intolerance: unknown  Appetite: Normal.   Intake: >75%  GI Symptoms : None Frequency: absent  Swallowing Difficulty: No problems with swallowing  Dentition : own    Types of Activities: Cardio Workouts/ [place]   Duration: 30 minutes a day 5 days during the week and one 30- 45 minutes session on the weekend.*   Total exercise per week is " "~ 195 minutes of both moderate to vigorous exercise.     Sleep duration/quality : unknown   Sleep disorders: none    Supplements: Vitamin D daily    Feelings of Hunger?: Yes but ignores cue now she is responding to hunger         Physical Feeling: Varies depending on meal consumed.           Binging: Periods of restriction then overeating  Cravings: None NOTE: she can be driven to eat after eating just a CHO as identified by the taste satiety exercise. She was never tested for PCOS but she suspects it.   Energy Levels: Fluctuates    Food Preparation: Patient and Partner/Spouse  Cooking Skills/Barriers: None reported  Grocery Shopping: Patient and Partner/Spouse    Eating Out Type: Restaurant and Take Out  3 times per week  Convenience Foods: protein bars   3 -4  times per week    Nutrition Focused Physical Exam:    Performed/Deferred: Deferred due to be being virtual visit    Estimated Energy Needs:  Energy Needs  Calculated Energy Needs Using Equations  Height: 154.9 cm (5' 0.98\")  Weight Used for Equation Calculations: 73.9 kg (162 lb 14.7 oz)  Cassy Andrade Equation (Overweight or Obese Patients): 1351  Equation Chosen to Use by RD: Newton Matias  Activity Factor: 1.4  Total Energy Needs: 1900  Estimated Energy Needs  Total Energy Estimated Needs (kCal): 1400 kCal     Nutrition Diagnosis:    Diagnosis Statement 1:  Diagnosis status: Ongoing   Diagnosis : Food and nutrition related knowledge deficit related to  how to eat for wt loss considering past hx of disordered eating  as evidenced by  reports by pt of the need to learn    Diagnosis Statement 2:  Diagnosis status: Ongoing  Diagnosis : Predicted excessive energy intake  related to  lack of meal and snack schedule and structure that could reduce the metabolism and impede wt loss   as evidenced by  24 recall shows meals that can be skipped and protein can be omitted at meals.     Diagnosis Statement 3:   Diagnosis status: Improving   Diagnosis : " Inadequate protein intake  related to  current eating trend were protein is generally limited during the day  as evidenced by  protein intake does not meet daily needs.     Nutrition Interventions:  Medical nutrition therapy was given for wt loss.   Nutrition Counseling: CBT  Coordination of Care: None    Nutrition Goals:  XXXX calories per day for 1 lb wt loss. Adequate protein intake of 74 grams per day. CHO intake lower but consistent intake. Heart healthy meal plan with a low saturated fat intake to <5 -6 % of energy (less than 10 grams of saturated fat per day), reduced intake of added sugars (<10% of total energy), 25 grams of fiber with intake of viscous fiber to 5 g/day to 10 g/day, plant sterols/stanols to 2 g/day, 1.1 gram of omega three fatty acids to reduce LDL cholesterol. Increase awareness and response to hunger and satiety cues.  Reduce occurrence and severity of overeating/binge eating.     Nutrition Recommendations:  Via teach back method patient verbalized understanding of the following topics:    Taste satiety exercise was performed today.      Developing inner wisdom now shifts to exploring how the body lets us know that we may have eaten enough. The first feedback for satiety (“eating enough”) comes from our taste buds. Even with highly satisfying foods, the taste buds tire quickly. Paying full attention to flavor and texture can help bring greater pleasure from small amounts of food, thereby cultivating your “inner gourmet”     Remember, when we feel hunger, it allows us to taste food more. When we are too hungry, certain foods and or flavors are desired. Processed foods may decrease in taste the more it is consumed; this is taste satiety. Gourmet foods keep the flavor/taste as you eat. Strive to enjoy the food.     Attached is a taste satiety meditation. Consider listening to meditation 5 times a week for the next few weeks.     Practice eating two meals mindfully every day paying special  attention to taste experience: let yourself really enjoy the food as you are first eating it, then become aware of taste satisfaction, and then try to stop eating foods if they are no longer as satisfying or enjoyable. Practice becoming a “mindful gourmet”! IF you notice eating past a point of satisfaction, consider why that might have happened (socializing; emotions; boredom; nutritional needs).     Educational Handouts/Exercise: Taste Satiety Exercise/Meditation   Fullness Awareness exercise   Hunger Awareness Meditation and Scale   Mindfulness Meditation for the breath,   Plate, DHI's Weight loss and Metabolism     Next session: Eating Triggers Meditation     Hope Vu, MS, RDN, LD, MAMTA, MB-EAT-P  Advanced Practice Clinical Dietitian   Mindfulness-Based Eating Awareness Training Practitioner  [facility]   [facility]   [email or web address]  Scheduling Line [phone]  Direct Line [phone]    Readiness to Change : Good  Level of Understanding : Good  Anticipated Compliant : Good

## 2024-11-21 PROBLEM — D21.9 FIBROID: Status: ACTIVE | Noted: 2024-11-20

## 2024-11-26 ENCOUNTER — TELEPHONE (OUTPATIENT)
Dept: OBSTETRICS AND GYNECOLOGY | Facility: CLINIC | Age: 39
End: 2024-11-26
Payer: COMMERCIAL

## 2024-11-26 NOTE — TELEPHONE ENCOUNTER
RN called pt and LM with pt upcoming surgery information. Request call back if needed. UserMojo message sent to pt as well.

## 2024-12-12 ENCOUNTER — LAB (OUTPATIENT)
Dept: LAB | Facility: LAB | Age: 39
End: 2024-12-12
Payer: COMMERCIAL

## 2024-12-12 DIAGNOSIS — E55.9 VITAMIN D DEFICIENCY: ICD-10-CM

## 2024-12-12 DIAGNOSIS — Z00.00 ANNUAL PHYSICAL EXAM: ICD-10-CM

## 2024-12-12 LAB
25(OH)D3 SERPL-MCNC: 14 NG/ML (ref 30–100)
ALBUMIN SERPL BCP-MCNC: 4.6 G/DL (ref 3.4–5)
ALP SERPL-CCNC: 51 U/L (ref 33–110)
ALT SERPL W P-5'-P-CCNC: 13 U/L (ref 7–45)
ANION GAP SERPL CALC-SCNC: 13 MMOL/L (ref 10–20)
AST SERPL W P-5'-P-CCNC: 15 U/L (ref 9–39)
BASOPHILS # BLD AUTO: 0.07 X10*3/UL (ref 0–0.1)
BASOPHILS NFR BLD AUTO: 0.8 %
BILIRUB SERPL-MCNC: 0.6 MG/DL (ref 0–1.2)
BUN SERPL-MCNC: 21 MG/DL (ref 6–23)
CALCIUM SERPL-MCNC: 9.4 MG/DL (ref 8.6–10.3)
CHLORIDE SERPL-SCNC: 105 MMOL/L (ref 98–107)
CHOLEST SERPL-MCNC: 186 MG/DL (ref 0–199)
CHOLESTEROL/HDL RATIO: 3.4
CO2 SERPL-SCNC: 27 MMOL/L (ref 21–32)
CREAT SERPL-MCNC: 0.96 MG/DL (ref 0.5–1.05)
EGFRCR SERPLBLD CKD-EPI 2021: 77 ML/MIN/1.73M*2
EOSINOPHIL # BLD AUTO: 0.63 X10*3/UL (ref 0–0.7)
EOSINOPHIL NFR BLD AUTO: 7.3 %
ERYTHROCYTE [DISTWIDTH] IN BLOOD BY AUTOMATED COUNT: 12 % (ref 11.5–14.5)
EST. AVERAGE GLUCOSE BLD GHB EST-MCNC: 94 MG/DL
GLUCOSE SERPL-MCNC: 87 MG/DL (ref 74–99)
HBA1C MFR BLD: 4.9 %
HCT VFR BLD AUTO: 47.9 % (ref 36–46)
HDLC SERPL-MCNC: 55.2 MG/DL
HGB BLD-MCNC: 15.6 G/DL (ref 12–16)
IMM GRANULOCYTES # BLD AUTO: 0.03 X10*3/UL (ref 0–0.7)
IMM GRANULOCYTES NFR BLD AUTO: 0.3 % (ref 0–0.9)
LDLC SERPL CALC-MCNC: 121 MG/DL
LYMPHOCYTES # BLD AUTO: 2.61 X10*3/UL (ref 1.2–4.8)
LYMPHOCYTES NFR BLD AUTO: 30.4 %
MCH RBC QN AUTO: 30.8 PG (ref 26–34)
MCHC RBC AUTO-ENTMCNC: 32.6 G/DL (ref 32–36)
MCV RBC AUTO: 95 FL (ref 80–100)
MONOCYTES # BLD AUTO: 0.59 X10*3/UL (ref 0.1–1)
MONOCYTES NFR BLD AUTO: 6.9 %
NEUTROPHILS # BLD AUTO: 4.65 X10*3/UL (ref 1.2–7.7)
NEUTROPHILS NFR BLD AUTO: 54.3 %
NON HDL CHOLESTEROL: 131 MG/DL (ref 0–149)
NRBC BLD-RTO: 0 /100 WBCS (ref 0–0)
PLATELET # BLD AUTO: 312 X10*3/UL (ref 150–450)
POTASSIUM SERPL-SCNC: 5 MMOL/L (ref 3.5–5.3)
PROT SERPL-MCNC: 6.9 G/DL (ref 6.4–8.2)
RBC # BLD AUTO: 5.07 X10*6/UL (ref 4–5.2)
SODIUM SERPL-SCNC: 140 MMOL/L (ref 136–145)
TRIGL SERPL-MCNC: 49 MG/DL (ref 0–149)
TSH SERPL-ACNC: 1.36 MIU/L (ref 0.44–3.98)
VLDL: 10 MG/DL (ref 0–40)
WBC # BLD AUTO: 8.6 X10*3/UL (ref 4.4–11.3)

## 2024-12-12 PROCEDURE — 80053 COMPREHEN METABOLIC PANEL: CPT

## 2024-12-12 PROCEDURE — 84443 ASSAY THYROID STIM HORMONE: CPT

## 2024-12-12 PROCEDURE — 85025 COMPLETE CBC W/AUTO DIFF WBC: CPT

## 2024-12-12 PROCEDURE — 36415 COLL VENOUS BLD VENIPUNCTURE: CPT

## 2024-12-12 PROCEDURE — 80061 LIPID PANEL: CPT

## 2024-12-12 PROCEDURE — 82306 VITAMIN D 25 HYDROXY: CPT

## 2024-12-12 PROCEDURE — 83036 HEMOGLOBIN GLYCOSYLATED A1C: CPT

## 2024-12-16 ENCOUNTER — APPOINTMENT (OUTPATIENT)
Dept: PRIMARY CARE | Facility: CLINIC | Age: 39
End: 2024-12-16
Payer: COMMERCIAL

## 2024-12-16 VITALS
SYSTOLIC BLOOD PRESSURE: 116 MMHG | RESPIRATION RATE: 16 BRPM | HEIGHT: 60 IN | DIASTOLIC BLOOD PRESSURE: 72 MMHG | OXYGEN SATURATION: 98 % | BODY MASS INDEX: 32.39 KG/M2 | WEIGHT: 165 LBS | TEMPERATURE: 98.4 F | HEART RATE: 71 BPM

## 2024-12-16 DIAGNOSIS — J45.20 MILD INTERMITTENT ASTHMA WITHOUT COMPLICATION (HHS-HCC): ICD-10-CM

## 2024-12-16 DIAGNOSIS — Z00.00 ANNUAL PHYSICAL EXAM: Primary | ICD-10-CM

## 2024-12-16 DIAGNOSIS — E55.9 VITAMIN D DEFICIENCY: ICD-10-CM

## 2024-12-16 DIAGNOSIS — R71.8 ELEVATED HEMATOCRIT: ICD-10-CM

## 2024-12-16 PROCEDURE — 99396 PREV VISIT EST AGE 40-64: CPT | Performed by: FAMILY MEDICINE

## 2024-12-16 PROCEDURE — RXMED WILLOW AMBULATORY MEDICATION CHARGE

## 2024-12-16 PROCEDURE — 1036F TOBACCO NON-USER: CPT | Performed by: FAMILY MEDICINE

## 2024-12-16 PROCEDURE — 3008F BODY MASS INDEX DOCD: CPT | Performed by: FAMILY MEDICINE

## 2024-12-16 RX ORDER — ERGOCALCIFEROL 1.25 MG/1
50000 CAPSULE ORAL WEEKLY
Qty: 4 CAPSULE | Refills: 1 | Status: SHIPPED | OUTPATIENT
Start: 2024-12-16 | End: 2025-02-10

## 2024-12-16 RX ORDER — BUDESONIDE AND FORMOTEROL FUMARATE DIHYDRATE 80; 4.5 UG/1; UG/1
2 AEROSOL RESPIRATORY (INHALATION) 2 TIMES DAILY
Qty: 30.6 G | Refills: 3 | Status: SHIPPED | OUTPATIENT
Start: 2024-12-16 | End: 2025-12-16

## 2024-12-16 NOTE — PROGRESS NOTES
Subjective   Lety Lynn is a 39 y.o. female who presents for Annual Exam.  HPI  PMH:  Pap NIL HPV neg 1/2023    Here for CPE     Cont to gradually lose wt. Started qsymia in sept.     Planning on hyster in April d/t symptomatic fibroids.     Mom w likely melanoma. Due for FSE.     Needs RF on symbicort. Breathing well.     Vit D low inconsistent w MVI       Current Outpatient Medications on File Prior to Visit   Medication Sig Dispense Refill    cetirizine HCl (ZYRTEC ORAL) Take by mouth if needed.      cholecalciferol (Vitamin D-3) 125 MCG (5000 UT) capsule Take 1 capsule (125 mcg) by mouth 1 (one) time per week.      levonorgestrel (Mirena) 21 mcg/24 hr (8 yrs) 52 mg IUD 52 mg by intrauterine route 1 time. Placed 2021      phentermine-topiramate (Qsymia) 3.75-23 mg capsule, ER multiphase 24 hr Take 1 capsule by mouth once daily. 30 capsule 2    [DISCONTINUED] budesonide-formoteroL (Symbicort) 80-4.5 mcg/actuation inhaler INHALE 2 PUFFS TWO TIMES A DAY. RINSE MOUTH AFTER USE 30.6 g 3    [DISCONTINUED] mometasone-formoterol (Dulera 100) 100-5 mcg/actuation inhaler Inhale 2 puffs twice a day. As instructed       No current facility-administered medications on file prior to visit.                  Objective   /72   Pulse 71   Temp 36.9 °C (98.4 °F)   Resp 16   Ht 1.524 m (5')   Wt 74.8 kg (165 lb)   SpO2 98%   BMI 32.22 kg/m²    Physical Exam  General: NAD  HEENT:NCAT, PERRLA, nml OP, b/l TM clear   Neck: no cervical NADINE  Heart: RRR no murmur, no edema   Lungs: CTA b/l, no wheeze or rhonchi   GI: abd soft, nontender, nondistended.   MSK: no c/c/e. nml gait   Skin: warm and dry  Psych: cooperative, appropriate affect  Neuro: speech clear. A&Ox3  Assessment/Plan   Problem List Items Addressed This Visit       Asthma  Controlled RF Symbicort     Relevant Medications    budesonide-formoteroL (Symbicort) 80-4.5 mcg/actuation inhaler     Other Visit Diagnoses       Annual physical exam    -   Primary  overall doing well. Cont balanced diet/reg ex   BMI: 32  VACC: reviewed flu/PNA UTD   COLON CA SCRN: 45  LABS: reviewed w pt at goal besides aforementioned  PAP: UTD  MAMMO: UTD  DEXA: 65  RTC yearly or prn     Vitamin D deficiency      Vit D quite low at 14   Start high dose vit D x 8 wk   Rpt labs 2 mo       Relevant Medications    ergocalciferol (Vitamin D-2) 1.25 MG (72411 UT) capsule    Other Relevant Orders    Vitamin D 25 hydroxy    Elevated hematocrit      Rpt labs in 2 mo w Fe panel   ?heme concentrated      Relevant Orders    CBC and Auto Differential    Ferritin    Iron and TIBC    BMI 32: making progress w qysmia and lifestyle changes  cont w qsymia, titrate dose when needed

## 2024-12-27 ENCOUNTER — PHARMACY VISIT (OUTPATIENT)
Dept: PHARMACY | Facility: CLINIC | Age: 39
End: 2024-12-27
Payer: COMMERCIAL

## 2024-12-27 PROCEDURE — RXOTC WILLOW AMBULATORY OTC CHARGE

## 2025-01-08 ENCOUNTER — APPOINTMENT (OUTPATIENT)
Dept: OBSTETRICS AND GYNECOLOGY | Facility: CLINIC | Age: 40
End: 2025-01-08
Payer: COMMERCIAL

## 2025-01-13 ENCOUNTER — LAB (OUTPATIENT)
Dept: LAB | Facility: LAB | Age: 40
End: 2025-01-13
Payer: COMMERCIAL

## 2025-01-13 DIAGNOSIS — R39.9 UTI SYMPTOMS: ICD-10-CM

## 2025-01-13 PROCEDURE — 87086 URINE CULTURE/COLONY COUNT: CPT

## 2025-01-14 LAB — BACTERIA UR CULT: NORMAL

## 2025-01-31 ENCOUNTER — APPOINTMENT (OUTPATIENT)
Dept: OBSTETRICS AND GYNECOLOGY | Facility: CLINIC | Age: 40
End: 2025-01-31
Payer: COMMERCIAL

## 2025-02-03 ENCOUNTER — TELEPHONE (OUTPATIENT)
Dept: PRIMARY CARE | Facility: CLINIC | Age: 40
End: 2025-02-03
Payer: COMMERCIAL

## 2025-02-03 NOTE — TELEPHONE ENCOUNTER
Squrriels are considered a low risk animal and rabies prophylaxis is not indicated   Her tdap is UTD so she's does not need a tdap

## 2025-02-03 NOTE — TELEPHONE ENCOUNTER
Yesterday squirrel possibly scratched pt on top of head.  Pt does not feel a laceration, but is concerned about protocol in this situation.   Asked if she needs a rabies shot?

## 2025-04-06 NOTE — HOSPITAL COURSE
[ ] Consent    Lety Lynn is a 39 y.o. female presenting for total robotic hysterectomy, bilateral salpingectomy, any indicated procedure as definitive management for large anterior fibroid causing significant bulk symptoms.      Preop labs (): Hgb, Plt, Cr  Lab Results   Component Value Date    HGB 15.6 2024     2024    CREATININE 0.96 2024    HGBA1C 4.9 2024     Imaging:   - MRI pelvis 2024 uterus measuring 12cm x 12cm x 10.5 cm. EMS 4mm with IUD in place. Intramural and exophytic fibroid 12 x 12 x 8 cm in R anterior uterine body. R ovary wnl, L ovary overall normal appearing with 3.5cm simple cyst present.   - Pelvic US 10/2024 showed uterus measuring 12cm x 3cm x 8cm. EMS 1cm. Fibroid noted in JOY, measuring 11cm x 11cm x 10cm (on US 2023, was 9cm x 0.4cm x 6m). Bilateral ovaries WNL. IUD in place.   Labs:   - TSH  WNL   Screening:   - Last pap 2023 negative/negative, no hx of CIN2-3  - Last mammogram 10/2024 BIRADS 1  PMHx: Moderate persistent asthma (well controlled), remote hx of eating disorder in long remission   PSHx: [place] teeth, tonsils + adenoids    POb/Gyn History:  OB History    Para Term  AB Living   7 3 3 0 4 3   SAB IAB Ectopic Multiple Live Births   3 0 1 0 3      # Outcome Date GA Lbr [place]/2nd Weight Sex Type Anes PTL Lv   7 Ectopic            6 SAB            5 SAB            4 SAB            3 Term            2 Term            1 Term                 Social History:  Social History     Socioeconomic History    Marital status:    Tobacco Use    Smoking status: Never    Smokeless tobacco: Never   Substance and Sexual Activity    Alcohol use: Yes     Alcohol/week: 3.0 standard drinks of alcohol     Types: 3 Standard drinks or equivalent per week    Drug use: Never        Family History:  Family History   Problem Relation Name Age of Onset    Hyperlipidemia Mother      Hypertension Mother      Obesity Mother      Other  (DEPRESSION AND ANXIETY) Mother      Hyperlipidemia Father      Hypertension Father      Obesity Father      Depression Sister      Obesity Sister      Crohn's disease Brother      Obesity Brother      Other (DEPRESSION AND ANXIETY) Brother      Uterine cancer Paternal Grandmother      Heart disease Paternal Grandfather      Heart attack Paternal Grandfather      Cancer Other aunt     Other (MINISTERIO) Other aunt         maturity onset diabetes mellitus in young    Obesity Other          several family members        Medications:  Current Outpatient Medications   Medication Instructions    budesonide-formoteroL (Symbicort) 80-4.5 mcg/actuation inhaler INHALE 2 PUFFS TWO TIMES A DAY. RINSE MOUTH AFTER USE    cetirizine HCl (ZYRTEC ORAL) As needed    cholecalciferol (VITAMIN D-3) 125 mcg, Once Weekly    levonorgestrel (Mirena) 21 mcg/24 hr (8 yrs) 52 mg IUD 1 each, Once    phentermine-topiramate (Qsymia) 3.75-23 mg capsule, ER multiphase 24 hr 1 capsule, oral, Daily       Allergies:  Patient has no known allergies.

## 2025-04-07 ENCOUNTER — HOSPITAL ENCOUNTER (OUTPATIENT)
Facility: HOSPITAL | Age: 40
Setting detail: OUTPATIENT SURGERY
Discharge: HOME | End: 2025-04-07
Attending: STUDENT IN AN ORGANIZED HEALTH CARE EDUCATION/TRAINING PROGRAM | Admitting: STUDENT IN AN ORGANIZED HEALTH CARE EDUCATION/TRAINING PROGRAM
Payer: COMMERCIAL

## 2025-04-07 ENCOUNTER — ANESTHESIA EVENT (OUTPATIENT)
Dept: OPERATING ROOM | Facility: HOSPITAL | Age: 40
End: 2025-04-07
Payer: COMMERCIAL

## 2025-04-07 ENCOUNTER — ANESTHESIA (OUTPATIENT)
Dept: OPERATING ROOM | Facility: HOSPITAL | Age: 40
End: 2025-04-07
Payer: COMMERCIAL

## 2025-04-07 VITALS
BODY MASS INDEX: 31.16 KG/M2 | DIASTOLIC BLOOD PRESSURE: 61 MMHG | SYSTOLIC BLOOD PRESSURE: 109 MMHG | RESPIRATION RATE: 16 BRPM | HEIGHT: 62 IN | HEART RATE: 80 BPM | TEMPERATURE: 97.3 F | WEIGHT: 169.31 LBS | OXYGEN SATURATION: 97 %

## 2025-04-07 DIAGNOSIS — Z90.710 STATUS POST HYSTERECTOMY: ICD-10-CM

## 2025-04-07 DIAGNOSIS — D21.9 FIBROID: Primary | ICD-10-CM

## 2025-04-07 LAB
ABO GROUP (TYPE) IN BLOOD: NORMAL
ANTIBODY SCREEN: NORMAL
PREGNANCY TEST URINE, POC: NEGATIVE
RH FACTOR (ANTIGEN D): NORMAL

## 2025-04-07 PROCEDURE — A58571 PR LAPAROSCOPY W TOT HYSTERECTUTERUS <=250 GRAM  W TUBE/OVARY

## 2025-04-07 PROCEDURE — 7100000010 HC PHASE TWO TIME - EACH INCREMENTAL 1 MINUTE: Performed by: STUDENT IN AN ORGANIZED HEALTH CARE EDUCATION/TRAINING PROGRAM

## 2025-04-07 PROCEDURE — 7100000001 HC RECOVERY ROOM TIME - INITIAL BASE CHARGE: Performed by: STUDENT IN AN ORGANIZED HEALTH CARE EDUCATION/TRAINING PROGRAM

## 2025-04-07 PROCEDURE — 7100000009 HC PHASE TWO TIME - INITIAL BASE CHARGE: Performed by: STUDENT IN AN ORGANIZED HEALTH CARE EDUCATION/TRAINING PROGRAM

## 2025-04-07 PROCEDURE — 2500000005 HC RX 250 GENERAL PHARMACY W/O HCPCS: Performed by: STUDENT IN AN ORGANIZED HEALTH CARE EDUCATION/TRAINING PROGRAM

## 2025-04-07 PROCEDURE — 2500000004 HC RX 250 GENERAL PHARMACY W/ HCPCS (ALT 636 FOR OP/ED)

## 2025-04-07 PROCEDURE — 3700000002 HC GENERAL ANESTHESIA TIME - EACH INCREMENTAL 1 MINUTE: Performed by: STUDENT IN AN ORGANIZED HEALTH CARE EDUCATION/TRAINING PROGRAM

## 2025-04-07 PROCEDURE — 2780000003 HC OR 278 NO HCPCS: Performed by: STUDENT IN AN ORGANIZED HEALTH CARE EDUCATION/TRAINING PROGRAM

## 2025-04-07 PROCEDURE — 86901 BLOOD TYPING SEROLOGIC RH(D): CPT | Performed by: STUDENT IN AN ORGANIZED HEALTH CARE EDUCATION/TRAINING PROGRAM

## 2025-04-07 PROCEDURE — 2500000004 HC RX 250 GENERAL PHARMACY W/ HCPCS (ALT 636 FOR OP/ED): Performed by: STUDENT IN AN ORGANIZED HEALTH CARE EDUCATION/TRAINING PROGRAM

## 2025-04-07 PROCEDURE — 2500000004 HC RX 250 GENERAL PHARMACY W/ HCPCS (ALT 636 FOR OP/ED): Mod: JZ | Performed by: ANESTHESIOLOGY

## 2025-04-07 PROCEDURE — 3600000017 HC OR TIME - EACH INCREMENTAL 1 MINUTE - PROCEDURE LEVEL SIX: Performed by: STUDENT IN AN ORGANIZED HEALTH CARE EDUCATION/TRAINING PROGRAM

## 2025-04-07 PROCEDURE — 3700000001 HC GENERAL ANESTHESIA TIME - INITIAL BASE CHARGE: Performed by: STUDENT IN AN ORGANIZED HEALTH CARE EDUCATION/TRAINING PROGRAM

## 2025-04-07 PROCEDURE — 2720000007 HC OR 272 NO HCPCS: Performed by: STUDENT IN AN ORGANIZED HEALTH CARE EDUCATION/TRAINING PROGRAM

## 2025-04-07 PROCEDURE — 7100000002 HC RECOVERY ROOM TIME - EACH INCREMENTAL 1 MINUTE: Performed by: STUDENT IN AN ORGANIZED HEALTH CARE EDUCATION/TRAINING PROGRAM

## 2025-04-07 PROCEDURE — 88307 TISSUE EXAM BY PATHOLOGIST: CPT | Mod: TC,SUR,WESLAB | Performed by: STUDENT IN AN ORGANIZED HEALTH CARE EDUCATION/TRAINING PROGRAM

## 2025-04-07 PROCEDURE — A58571 PR LAPAROSCOPY W TOT HYSTERECTUTERUS <=250 GRAM  W TUBE/OVARY: Performed by: ANESTHESIOLOGY

## 2025-04-07 PROCEDURE — 58573 TLH W/T/O UTERUS OVER 250 G: CPT | Performed by: STUDENT IN AN ORGANIZED HEALTH CARE EDUCATION/TRAINING PROGRAM

## 2025-04-07 PROCEDURE — 3600000018 HC OR TIME - INITIAL BASE CHARGE - PROCEDURE LEVEL SIX: Performed by: STUDENT IN AN ORGANIZED HEALTH CARE EDUCATION/TRAINING PROGRAM

## 2025-04-07 PROCEDURE — 2500000005 HC RX 250 GENERAL PHARMACY W/O HCPCS

## 2025-04-07 PROCEDURE — 36415 COLL VENOUS BLD VENIPUNCTURE: CPT | Performed by: STUDENT IN AN ORGANIZED HEALTH CARE EDUCATION/TRAINING PROGRAM

## 2025-04-07 PROCEDURE — 2500000001 HC RX 250 WO HCPCS SELF ADMINISTERED DRUGS (ALT 637 FOR MEDICARE OP): Performed by: STUDENT IN AN ORGANIZED HEALTH CARE EDUCATION/TRAINING PROGRAM

## 2025-04-07 PROCEDURE — 81025 URINE PREGNANCY TEST: CPT | Performed by: ANESTHESIOLOGY

## 2025-04-07 PROCEDURE — 2500000005 HC RX 250 GENERAL PHARMACY W/O HCPCS: Performed by: ANESTHESIOLOGY

## 2025-04-07 RX ORDER — METRONIDAZOLE 500 MG/100ML
500 INJECTION, SOLUTION INTRAVENOUS ONCE
Status: COMPLETED | OUTPATIENT
Start: 2025-04-07 | End: 2025-04-07

## 2025-04-07 RX ORDER — LABETALOL HYDROCHLORIDE 5 MG/ML
5 INJECTION, SOLUTION INTRAVENOUS ONCE AS NEEDED
Status: DISCONTINUED | OUTPATIENT
Start: 2025-04-07 | End: 2025-04-11 | Stop reason: HOSPADM

## 2025-04-07 RX ORDER — METOCLOPRAMIDE HYDROCHLORIDE 5 MG/ML
10 INJECTION INTRAMUSCULAR; INTRAVENOUS ONCE AS NEEDED
Status: DISCONTINUED | OUTPATIENT
Start: 2025-04-07 | End: 2025-04-11 | Stop reason: HOSPADM

## 2025-04-07 RX ORDER — ROCURONIUM BROMIDE 10 MG/ML
INJECTION, SOLUTION INTRAVENOUS AS NEEDED
Status: DISCONTINUED | OUTPATIENT
Start: 2025-04-07 | End: 2025-04-07

## 2025-04-07 RX ORDER — CEFAZOLIN SODIUM 2 G/100ML
2 INJECTION, SOLUTION INTRAVENOUS ONCE
Status: DISCONTINUED | OUTPATIENT
Start: 2025-04-07 | End: 2025-04-07 | Stop reason: HOSPADM

## 2025-04-07 RX ORDER — GABAPENTIN 600 MG/1
600 TABLET ORAL ONCE
Status: COMPLETED | OUTPATIENT
Start: 2025-04-07 | End: 2025-04-07

## 2025-04-07 RX ORDER — LIDOCAINE HYDROCHLORIDE 40 MG/ML
SOLUTION TOPICAL AS NEEDED
Status: DISCONTINUED | OUTPATIENT
Start: 2025-04-07 | End: 2025-04-07

## 2025-04-07 RX ORDER — AMOXICILLIN 250 MG
1 CAPSULE ORAL DAILY
Qty: 30 TABLET | Refills: 0 | Status: SHIPPED | OUTPATIENT
Start: 2025-04-07

## 2025-04-07 RX ORDER — LIDOCAINE HCL/PF 100 MG/5ML
SYRINGE (ML) INTRAVENOUS AS NEEDED
Status: DISCONTINUED | OUTPATIENT
Start: 2025-04-07 | End: 2025-04-07

## 2025-04-07 RX ORDER — SODIUM CHLORIDE, SODIUM LACTATE, POTASSIUM CHLORIDE, CALCIUM CHLORIDE 600; 310; 30; 20 MG/100ML; MG/100ML; MG/100ML; MG/100ML
INJECTION, SOLUTION INTRAVENOUS CONTINUOUS PRN
Status: DISCONTINUED | OUTPATIENT
Start: 2025-04-07 | End: 2025-04-07

## 2025-04-07 RX ORDER — OXYCODONE AND ACETAMINOPHEN 5; 325 MG/1; MG/1
1 TABLET ORAL EVERY 4 HOURS PRN
Status: DISCONTINUED | OUTPATIENT
Start: 2025-04-07 | End: 2025-04-11 | Stop reason: HOSPADM

## 2025-04-07 RX ORDER — SODIUM CHLORIDE 0.9 G/100ML
INJECTION, SOLUTION IRRIGATION AS NEEDED
Status: DISCONTINUED | OUTPATIENT
Start: 2025-04-07 | End: 2025-04-07 | Stop reason: HOSPADM

## 2025-04-07 RX ORDER — ACETAMINOPHEN 325 MG/1
650 TABLET ORAL EVERY 6 HOURS PRN
Qty: 20 TABLET | Refills: 0 | Status: SHIPPED | OUTPATIENT
Start: 2025-04-07

## 2025-04-07 RX ORDER — SODIUM CHLORIDE, SODIUM LACTATE, POTASSIUM CHLORIDE, CALCIUM CHLORIDE 600; 310; 30; 20 MG/100ML; MG/100ML; MG/100ML; MG/100ML
100 INJECTION, SOLUTION INTRAVENOUS CONTINUOUS
Status: DISCONTINUED | OUTPATIENT
Start: 2025-04-07 | End: 2025-04-08 | Stop reason: HOSPADM

## 2025-04-07 RX ORDER — ONDANSETRON HYDROCHLORIDE 2 MG/ML
INJECTION, SOLUTION INTRAVENOUS AS NEEDED
Status: DISCONTINUED | OUTPATIENT
Start: 2025-04-07 | End: 2025-04-07

## 2025-04-07 RX ORDER — MIDAZOLAM HYDROCHLORIDE 1 MG/ML
INJECTION INTRAMUSCULAR; INTRAVENOUS AS NEEDED
Status: DISCONTINUED | OUTPATIENT
Start: 2025-04-07 | End: 2025-04-07

## 2025-04-07 RX ORDER — MIDAZOLAM HYDROCHLORIDE 1 MG/ML
1 INJECTION INTRAMUSCULAR; INTRAVENOUS ONCE AS NEEDED
Status: DISCONTINUED | OUTPATIENT
Start: 2025-04-07 | End: 2025-04-11 | Stop reason: HOSPADM

## 2025-04-07 RX ORDER — KETOROLAC TROMETHAMINE 30 MG/ML
INJECTION, SOLUTION INTRAMUSCULAR; INTRAVENOUS AS NEEDED
Status: DISCONTINUED | OUTPATIENT
Start: 2025-04-07 | End: 2025-04-07

## 2025-04-07 RX ORDER — CELECOXIB 200 MG/1
400 CAPSULE ORAL ONCE
Status: COMPLETED | OUTPATIENT
Start: 2025-04-07 | End: 2025-04-07

## 2025-04-07 RX ORDER — HYDROMORPHONE HYDROCHLORIDE 0.2 MG/ML
0.2 INJECTION INTRAMUSCULAR; INTRAVENOUS; SUBCUTANEOUS EVERY 5 MIN PRN
Status: DISCONTINUED | OUTPATIENT
Start: 2025-04-07 | End: 2025-04-11 | Stop reason: HOSPADM

## 2025-04-07 RX ORDER — KETAMINE HYDROCHLORIDE 10 MG/ML
INJECTION, SOLUTION INTRAMUSCULAR; INTRAVENOUS AS NEEDED
Status: DISCONTINUED | OUTPATIENT
Start: 2025-04-07 | End: 2025-04-07

## 2025-04-07 RX ORDER — PHENYLEPHRINE HCL IN 0.9% NACL 0.4MG/10ML
SYRINGE (ML) INTRAVENOUS AS NEEDED
Status: DISCONTINUED | OUTPATIENT
Start: 2025-04-07 | End: 2025-04-07

## 2025-04-07 RX ORDER — ACETAMINOPHEN 325 MG/1
650 TABLET ORAL EVERY 4 HOURS PRN
Status: DISCONTINUED | OUTPATIENT
Start: 2025-04-07 | End: 2025-04-11 | Stop reason: HOSPADM

## 2025-04-07 RX ORDER — PROPOFOL 10 MG/ML
INJECTION, EMULSION INTRAVENOUS AS NEEDED
Status: DISCONTINUED | OUTPATIENT
Start: 2025-04-07 | End: 2025-04-07

## 2025-04-07 RX ORDER — METOCLOPRAMIDE HYDROCHLORIDE 5 MG/ML
INJECTION INTRAMUSCULAR; INTRAVENOUS AS NEEDED
Status: DISCONTINUED | OUTPATIENT
Start: 2025-04-07 | End: 2025-04-07

## 2025-04-07 RX ORDER — OXYCODONE HYDROCHLORIDE 5 MG/1
10 TABLET ORAL EVERY 4 HOURS PRN
Status: DISCONTINUED | OUTPATIENT
Start: 2025-04-07 | End: 2025-04-11 | Stop reason: HOSPADM

## 2025-04-07 RX ORDER — TRAMADOL HYDROCHLORIDE 50 MG/1
50 TABLET ORAL EVERY 6 HOURS PRN
Qty: 12 TABLET | Refills: 0 | Status: SHIPPED | OUTPATIENT
Start: 2025-04-07

## 2025-04-07 RX ORDER — GLYCOPYRROLATE 0.2 MG/ML
INJECTION INTRAMUSCULAR; INTRAVENOUS AS NEEDED
Status: DISCONTINUED | OUTPATIENT
Start: 2025-04-07 | End: 2025-04-07

## 2025-04-07 RX ORDER — ONDANSETRON 4 MG/1
4 TABLET, ORALLY DISINTEGRATING ORAL EVERY 8 HOURS PRN
Qty: 20 TABLET | Refills: 0 | Status: SHIPPED | OUTPATIENT
Start: 2025-04-07

## 2025-04-07 RX ORDER — IBUPROFEN 600 MG/1
600 TABLET ORAL EVERY 6 HOURS PRN
Qty: 20 TABLET | Refills: 0 | Status: SHIPPED | OUTPATIENT
Start: 2025-04-07

## 2025-04-07 RX ORDER — BUPIVACAINE HYDROCHLORIDE 5 MG/ML
INJECTION, SOLUTION PERINEURAL AS NEEDED
Status: DISCONTINUED | OUTPATIENT
Start: 2025-04-07 | End: 2025-04-07 | Stop reason: HOSPADM

## 2025-04-07 RX ORDER — CEFAZOLIN 1 G/1
INJECTION, POWDER, FOR SOLUTION INTRAVENOUS AS NEEDED
Status: DISCONTINUED | OUTPATIENT
Start: 2025-04-07 | End: 2025-04-07

## 2025-04-07 RX ORDER — DROPERIDOL 2.5 MG/ML
0.62 INJECTION, SOLUTION INTRAMUSCULAR; INTRAVENOUS ONCE AS NEEDED
Status: DISCONTINUED | OUTPATIENT
Start: 2025-04-07 | End: 2025-04-11 | Stop reason: HOSPADM

## 2025-04-07 RX ORDER — FENTANYL CITRATE 50 UG/ML
INJECTION, SOLUTION INTRAMUSCULAR; INTRAVENOUS AS NEEDED
Status: DISCONTINUED | OUTPATIENT
Start: 2025-04-07 | End: 2025-04-07

## 2025-04-07 RX ORDER — ACETAMINOPHEN 325 MG/1
975 TABLET ORAL ONCE
Status: COMPLETED | OUTPATIENT
Start: 2025-04-07 | End: 2025-04-07

## 2025-04-07 RX ORDER — LIDOCAINE HYDROCHLORIDE 10 MG/ML
0.1 INJECTION, SOLUTION EPIDURAL; INFILTRATION; INTRACAUDAL; PERINEURAL ONCE
Status: DISCONTINUED | OUTPATIENT
Start: 2025-04-07 | End: 2025-04-11 | Stop reason: HOSPADM

## 2025-04-07 RX ADMIN — ROCURONIUM 10 MG: 50 INJECTION, SOLUTION INTRAVENOUS at 10:14

## 2025-04-07 RX ADMIN — METRONIDAZOLE 500 MG: 5 INJECTION, SOLUTION INTRAVENOUS at 07:41

## 2025-04-07 RX ADMIN — Medication 40 MCG: at 10:09

## 2025-04-07 RX ADMIN — ROCURONIUM 20 MG: 50 INJECTION, SOLUTION INTRAVENOUS at 09:44

## 2025-04-07 RX ADMIN — SODIUM CHLORIDE, POTASSIUM CHLORIDE, SODIUM LACTATE AND CALCIUM CHLORIDE: 600; 310; 30; 20 INJECTION, SOLUTION INTRAVENOUS at 09:12

## 2025-04-07 RX ADMIN — ACETAMINOPHEN 975 MG: 325 TABLET ORAL at 06:38

## 2025-04-07 RX ADMIN — Medication 120 MCG: at 08:37

## 2025-04-07 RX ADMIN — GLYCOPYRROLATE 0.2 MG: 0.2 INJECTION INTRAMUSCULAR; INTRAVENOUS at 07:45

## 2025-04-07 RX ADMIN — MIDAZOLAM HYDROCHLORIDE 2 MG: 2 INJECTION, SOLUTION INTRAMUSCULAR; INTRAVENOUS at 07:21

## 2025-04-07 RX ADMIN — SUGAMMADEX 200 MG: 100 INJECTION, SOLUTION INTRAVENOUS at 11:00

## 2025-04-07 RX ADMIN — ROCURONIUM 20 MG: 50 INJECTION, SOLUTION INTRAVENOUS at 08:42

## 2025-04-07 RX ADMIN — FENTANYL CITRATE 50 MCG: 50 INJECTION, SOLUTION INTRAMUSCULAR; INTRAVENOUS at 08:14

## 2025-04-07 RX ADMIN — GABAPENTIN 600 MG: 600 TABLET, FILM COATED ORAL at 06:38

## 2025-04-07 RX ADMIN — Medication 120 MCG: at 08:30

## 2025-04-07 RX ADMIN — SODIUM CHLORIDE, POTASSIUM CHLORIDE, SODIUM LACTATE AND CALCIUM CHLORIDE: 600; 310; 30; 20 INJECTION, SOLUTION INTRAVENOUS at 06:59

## 2025-04-07 RX ADMIN — ONDANSETRON 4 MG: 2 INJECTION, SOLUTION INTRAMUSCULAR; INTRAVENOUS at 10:09

## 2025-04-07 RX ADMIN — LIDOCAINE HYDROCHLORIDE 40 MG: 20 INJECTION, SOLUTION INTRAVENOUS at 07:32

## 2025-04-07 RX ADMIN — Medication 80 MCG: at 09:58

## 2025-04-07 RX ADMIN — METOCLOPRAMIDE HYDROCHLORIDE 10 MG: 5 INJECTION INTRAMUSCULAR; INTRAVENOUS at 07:38

## 2025-04-07 RX ADMIN — HYDROMORPHONE HYDROCHLORIDE 0.2 MG: 0.2 INJECTION, SOLUTION INTRAMUSCULAR; INTRAVENOUS; SUBCUTANEOUS at 12:08

## 2025-04-07 RX ADMIN — PROPOFOL 100 MG: 10 INJECTION, EMULSION INTRAVENOUS at 07:31

## 2025-04-07 RX ADMIN — ROCURONIUM 70 MG: 50 INJECTION, SOLUTION INTRAVENOUS at 07:32

## 2025-04-07 RX ADMIN — Medication 120 MCG: at 08:42

## 2025-04-07 RX ADMIN — KETOROLAC TROMETHAMINE 30 MG: 30 INJECTION, SOLUTION INTRAMUSCULAR; INTRAVENOUS at 11:03

## 2025-04-07 RX ADMIN — Medication 120 MCG: at 09:11

## 2025-04-07 RX ADMIN — FENTANYL CITRATE 100 MCG: 50 INJECTION, SOLUTION INTRAMUSCULAR; INTRAVENOUS at 07:31

## 2025-04-07 RX ADMIN — CEFAZOLIN 2 G: 1 INJECTION, POWDER, FOR SOLUTION INTRAMUSCULAR; INTRAVENOUS at 07:41

## 2025-04-07 RX ADMIN — Medication 30 MG: at 07:45

## 2025-04-07 RX ADMIN — LIDOCAINE HYDROCHLORIDE 4 ML: 40 SOLUTION TOPICAL at 07:35

## 2025-04-07 RX ADMIN — HYDROMORPHONE HYDROCHLORIDE 0.2 MG: 0.2 INJECTION, SOLUTION INTRAMUSCULAR; INTRAVENOUS; SUBCUTANEOUS at 11:23

## 2025-04-07 RX ADMIN — FENTANYL CITRATE 50 MCG: 50 INJECTION, SOLUTION INTRAMUSCULAR; INTRAVENOUS at 10:09

## 2025-04-07 RX ADMIN — Medication 120 MCG: at 09:41

## 2025-04-07 RX ADMIN — Medication 6 L/MIN: at 11:08

## 2025-04-07 RX ADMIN — ROCURONIUM 20 MG: 50 INJECTION, SOLUTION INTRAVENOUS at 09:11

## 2025-04-07 RX ADMIN — ROCURONIUM 10 MG: 50 INJECTION, SOLUTION INTRAVENOUS at 10:30

## 2025-04-07 RX ADMIN — ROCURONIUM 20 MG: 50 INJECTION, SOLUTION INTRAVENOUS at 08:10

## 2025-04-07 RX ADMIN — CELECOXIB 400 MG: 200 CAPSULE ORAL at 06:38

## 2025-04-07 RX ADMIN — Medication 80 MCG: at 08:53

## 2025-04-07 RX ADMIN — DEXAMETHASONE SODIUM PHOSPHATE 4 MG: 4 INJECTION INTRA-ARTICULAR; INTRALESIONAL; INTRAMUSCULAR; INTRAVENOUS; SOFT TISSUE at 07:44

## 2025-04-07 ASSESSMENT — PAIN - FUNCTIONAL ASSESSMENT
PAIN_FUNCTIONAL_ASSESSMENT: 0-10

## 2025-04-07 ASSESSMENT — PAIN SCALES - GENERAL
PAINLEVEL_OUTOF10: 5 - MODERATE PAIN
PAINLEVEL_OUTOF10: 5 - MODERATE PAIN
PAINLEVEL_OUTOF10: 0 - NO PAIN
PAINLEVEL_OUTOF10: 4
PAINLEVEL_OUTOF10: 5 - MODERATE PAIN
PAINLEVEL_OUTOF10: 4
PAINLEVEL_OUTOF10: 7

## 2025-04-07 ASSESSMENT — COLUMBIA-SUICIDE SEVERITY RATING SCALE - C-SSRS
2. HAVE YOU ACTUALLY HAD ANY THOUGHTS OF KILLING YOURSELF?: NO
1. IN THE PAST MONTH, HAVE YOU WISHED YOU WERE DEAD OR WISHED YOU COULD GO TO SLEEP AND NOT WAKE UP?: NO
6. HAVE YOU EVER DONE ANYTHING, STARTED TO DO ANYTHING, OR PREPARED TO DO ANYTHING TO END YOUR LIFE?: NO

## 2025-04-07 NOTE — ANESTHESIA PROCEDURE NOTES
Peripheral IV  Date/Time: 4/7/2025 7:35 AM  Inserted by: Alexander Esquivel MD    Placement  Needle size: 18 G  Laterality: right  Location: hand  Local anesthetic: none  Site prep: alcohol  Technique: anatomical landmarks  Attempts: 1

## 2025-04-07 NOTE — DISCHARGE INSTRUCTIONS
Laparoscopic Hysterectomy Discharge Instructions    Medications and Pain Management  Common areas of pain after laparoscopic hysterectomy include the incision pain, pain in between your shoulder blades, the pelvis and lower back. The gas that was used to distend your abdomen for the surgery is absorbed slowly into your blood stream over the first 3-4 days after surgery. It is not passed intestinally, although, because your abdomen is distended, it may feel similar to intestinal gas. Staying active and walking is the best way to promote the absorption of this gas.  Immediately after surgery, nerve pain is the most intense, typically for the first 6 to 12 hours. As the body heals, it creates inflammation around the incisions sites adding pressure and creating soreness. After 5 days, the inflammation begins to recede and significant improvement in soreness is expected. Pulling on the incisions, especially if sudden, such as when you cough, will reactivate the nerve pain. Support your abdomen with a pillow during coughing or sneezing as this will be helpful to minimize pain. There are two types of pain pills typically used for post-operative pain management, narcotics such as tramadol and an anti-inflammatory such as Ibuprofen or Naprosyn.  Taking regular anti-inflammatory pills, such as 600mg of Ibuprofen every 6 hours for the first 5 days and then as needed is recommended. You can alternate ibuprofen with tylenol (975mg or 1000mg). The tylenol can be taken every 6 hours.  If you have problems using NSAIDs, be sure to discuss this with your doctor. The narcotic can be used on a schedule for the first 1 to 2 days but after that, only as you need it. Narcotics can cause constipation, nausea, sleepiness and headaches. You may begin your usual home medications as you were taking before unless directed by your doctor.    Incisional care  Paper tape steri-strips are typically used for the abdominal incisions. The  steri-strips will fall off on their own or can be removed at your first post-operative appointment. You may shower and use a mild soap around the incisions and pat dry. Do not use a washcloth or scrub the incisions. Using peroxide or antiseptics is not recommended for routine care. Avoid hot and steamy showers as this may cause you to feel faint. No tub baths for six weeks following surgery. There may be discoloration or bruising around the incisions. This is normal and may take several weeks to resolve. Firmness or a nodular area under the skin near the incision may represent a collection of blood, this too will resolve on its own after a little time. If any incision develops tenderness, redness in the skin layer or has drainage please call the office.    Vaginal Discharge  You may have a mildly malodorous discharge and occasional spotting for up to 6 weeks. Do not put anything in the vagina like tampons or have sexual intercourse for 6 weeks after surgery. If you are having bleeding like a period, that is abnormal and you should contact your doctor.    GI Function, Nausea and Constipation  Nausea can occasionally be an issue in the first few days after surgery. It is usually caused as a side effect from the anesthesia and pain medicine, particularly narcotics. Taking the pain pills with food is a food way to proactively minimize this. Throwing up, especially after the first day, is not expected and if this happens, you should call your doctor. Feeling gassy and constipation can be a problem for the first week after surgery. Limiting the use of narcotics may be helpful. Stool softeners twice a day and a high fiber diet are safe. If needed, Miralax once daily is a good choice. If no bowel movement after 3 days, you will need to increase the Miralax until soft, regular bowel movements are passing.    Urinating  Because the bladder is disturbed by the surgery, the normal sensation may be temporarily altered. You may  not be aware that your bladder is full. If the bladder is allowed to get over distended, it may make the problem worse. This is why we make sure that you are able to empty your bladder adequately before you go home. For the first few days at home, you should make a point to empty your bladder every 3 to 4 hours. Pain with voiding, especially after the first day, is not expected and may represent a bladder infection.    Activity  For the first two days post-operatively, your soreness and recovery from anesthesia will limit your activity  Stairs are safe, just take your time  At a minimum during this time, you should walk around for 10-15 minutes every 2-3 hours. After that, in the first week, any activity except for overt exercise is safe.   During the first week you should not commit to being on your feet for more than 30 minutes at a time.   During the second week, light exercise is encouraged.  After 2 weeks from surgery, you should try to get back into regular activity other than heavy lifting.   For healing, please limit the amount of weight lifted to 8-10 pounds (a gallon of milk) for the first 6 weeks after surgery.   Driving is usually okay after the first few days. You must be able to comfortably wear a seatbelt, press the gas/brake pedals, and drive defensively. You may not drive while taking narcotic pain medicines.    When to Call the Doctor  Call for any fever above 100.4 F (If you do not feel feverish you do not have to routinely check your temperature.)  Call for severe pain not improved by medications  Call for persistent nausea, vomiting  Call for vaginal bleeding that is heavy as a period or passing blood clots larger than a quarter  Call for unusual swelling in your legs  Call if the incisions develop painful redness and discharge    In an emergency, call 911 or go to an Emergency Department at a nearby hospital

## 2025-04-07 NOTE — ANESTHESIA POSTPROCEDURE EVALUATION
Patient: Lety Lynn    Procedure Summary       Date: 04/07/25 Room / Location: [facility] OR 05 / [facility] OR    Anesthesia Start: 0726 Anesthesia Stop: 1113    Procedure: Total robotic hysterectomy, bilateral salpingectomy, CYSTOSCOPY (Bilateral: Pelvis) Diagnosis:       Fibroid      (Fibroid [D21.9])    Surgeons: Aurora Davis MD Responsible Provider: Alexander Esquivel MD    Anesthesia Type: general ASA Status: 2            Anesthesia Type: general    Vitals Value Taken Time   /53 04/07/25 1109   Temp 36.8 04/07/25 1114   Pulse 71 04/07/25 1111   Resp 18 04/07/25 1111   SpO2 100 % 04/07/25 1111   Vitals shown include unfiled device data.    Anesthesia Post Evaluation    Patient location during evaluation: PACU  Patient participation: complete - patient participated  Level of consciousness: awake and alert  Pain management: satisfactory to patient  Airway patency: patent  Two or more strategies used to mitigate risk of obstructive sleep apnea  Cardiovascular status: acceptable and blood pressure returned to baseline  Respiratory status: acceptable and face mask  Hydration status: acceptable  Postoperative Nausea and Vomiting: none      There were no known notable events for this encounter.

## 2025-04-07 NOTE — OP NOTE
Total robotic hysterectomy, bilateral salpingectomy, CYSTOSCOPY (B) Operative Note     Date: 2025  OR Location: [facility] OR    Name: Lety Lynn, : 1985, Age: 39 y.o., MRN: [id number], Sex: female    Diagnosis  Pre-op Diagnosis      * Fibroid [D21.9] Post-op Diagnosis     * Fibroid [D21.9]     Procedures  Total robotic hysterectomy, bilateral salpingectomy, CYSTOSCOPY  63509 - MS LAPAROSCOPY TOTAL HYSTERECTOMY UTERUS >250 GM  Bilateral ureterolysis  Contained vaginal morcellation  Oversewing of bladder     Surgeons      * Aurora Davis - Primary    Resident/Fellow/Other Assistant:  Surgeons and Role:     * Mauricio Das MD - Resident - Assisting     * Moriah Rao MD - Resident - Assisting    Staff:   Circulator: [place]  Scrub Person: Lobar  Relief Scrub: Chrissy    Anesthesia Staff: Anesthesiologist: Alexander Esquivel MD  CRNA: JERALD Blackburn-CRNA    Procedure Summary  Anesthesia: General  ASA: II  Estimated Blood Loss: 50 mL  Intra-op Medications:   Administrations occurring from 0645 to 1045 on 25:   Medication Name Total Dose   BUPivacaine HCl (Marcaine) 0.5 % (5 mg/mL) injection 10 mL   sodium chloride 0.9 % irrigation solution 1,000 mL   metroNIDAZOLE (Flagyl) 500 mg in sodium chloride (iso)  mL 500 mg   ceFAZolin (Ancef) vial 1 g 2 g   dexAMETHasone (Decadron) injection 4 mg/mL 4 mg   fentaNYL (Sublimaze) injection 50 mcg/mL 200 mcg   glycopyrrolate (Robinul) injection 0.2 mg   ketamine (Ketalar) 10 mg/mL injection 30 mg   lactated Ringer's infusion Cannot be calculated   lidocaine (cardiac) injection 2% prefilled syringe 40 mg   lidocaine (LTA Kit) for intubation 4 mL   metoclopramide (Reglan) injection 10 mg   midazolam PF (Versed) injection 1 mg/mL 2 mg   ondansetron (Zofran) 2 mg/mL injection 4 mg   phenylephrine 40 mcg/mL syringe 10 mL 800 mcg   propofol (Diprivan) injection 10 mg/mL 100 mg   rocuronium (ZeMuron) 50 mg/5 mL injection 170 mg              Anesthesia  Record               Intraprocedure I/O Totals          Intake    Ketamine 3.00 mL    The total shown is the total volume documented since Anesthesia Start was filed.    lactated Ringer's 1200.00 mL    metroNIDAZOLE (Flagyl) 500 mg in sodium chloride (iso)  mL 100.00 mL    Autologous Blood 0 mL    Cell Saver 0 mL    Total Intake 1303 mL       Output    Urine 200 mL    Est. Blood Loss 50 mL    NG/OG Tube Output 15 mL    Other 0 mL    Total Output 265 mL       Net    Net Volume 1038 mL          Specimen:   ID Type Source Tests Collected by Time   1 : UTERUS,CERVIX AND BILATERAL FALLOPIAN TUBES Tissue UTERUS, CERVIX, AND BILATERAL FALLOPIAN TUBES SURGICAL PATHOLOGY EXAM Aurora Davis MD 4/7/2025 1018        Drains and/or Catheters:   [REMOVED] Urethral Catheter Non-latex 16 Fr. (Removed)     Indications: Lety Lynn is an 39 y.o. female who is having surgery for Fibroid [D21.9].     Findings  Large anterior JOY/right broad ligament fibroid filling majority of pelvis with left displacement of otherwise normal uterus. Bladder pulled over anterior portion of fibroid with thinning and distortion of normal tissue planes. Grossly normal Fallopian tubes and ovaries. Grossly normal upper abdominal anatomy.     Description of Procedure  Patient was taken to the operating room where she was prepared and draped in the usual sterile fashion.  A [place] uterine manipulator was placed.  A Baxter catheter was placed. Attention was then turned abdominally.  The base of the umbilicus was elevated using Kocher clamps.  The skin was incised with a scalpel.  The fascia was grasped with Kochers and entered sharply using a scalpel.  Peritoneum was bluntly opened using a Irene clamp.  Intraperitoneal placement was confirmed via visualization of underlying bowel.     Genaro trocar was then placed at the umbilical entry site. Diagnostic laparoscopy was performed, and revealed findings as noted above.  No areas of trauma or  injury were noted immediately inferior to the umbilicus. Complete abdominal survey was performed. The patient was then placed in steep Trendelenburg positioning.     Ancillary trocars were then placed under direct visualization. Three 8mm trocars were placed, tw one in the LLQ and one in the RLQ.    Attention was turned to the pelvis. The right fallopian tube was elevated away from the underlying structures.  The mesosalpinx was clamped, sealed, and transected using Monopolar cautery.  The fallopian tube was  to the level of the uterine cornua.  The right utero-ovarian ligament was noted to be anteriorly displaced while the right round ligament was in a relatively normal location. The right ureter was identified over the right IP, and the peritoneum opened over the IP. The ureter was then identified in the retroperitoneum and complete ureterolysis was performed past the level of the uterine artery. The right UO was then clamped sealed and transected using the Vessel Sealer device. The right round ligament was then clamped, sealed, and transected. The broad ligament was then opened anteriorly and posteriorly to skeletonize the uterine vasculature.  The bladder flap was started with monopolar cautery.  The uterine vasculature was then clamped sealed and transected using the Vessel Sealer device.  It was lateralized using the Vessel Sealer device.      Attention was then turned to the left side of the pelvis, which was dissected in a similar fashion.     The Vcare LEENA cup was though to be displaced at this time, so decision was made to remove the Vcare and place an EA sizer. The sizer was able to better delineate the borders of the planned colpotomy site. Monopolar electrosurgery was then utilized to complete the bladder flap. The bladder was dissected away from the lower uterine segment and cervix using blunt dissection and monopolar electrosurgery.  At this time care was taken to ensure that the bladder was  well out of the operative field as well as the bilateral uterine pedicles. Colpotomy was then performed using monopolar electrosurgery.  The uterus was fully  from the vagina. The uterus, cervix, and bilateral Fallopian tubes were placed in the upper abdomen and a bag placed into the pelvis via the vagina. The uterus/cervix/Tubes were placed in the bag and contained morcellation performed until the specimen was entirely removed through the vagina.      The pelvis was inspected and noted be adequately hemostatic.  The vaginal cuff was then reapproximated laparoscopically using 0 V lock suture. Hemostasis was achieved using monopolar electrosurgery. The bladder was then back filled, and area of deserosalization was noted along the left lateral wall of the bladder w/ superficial visualization of detrusor muscles. This area was closed in 2 layers w/ running V-loc suture with the final layer re-approximating the serosa.     The umbilical fascia was then closed with 0 Vicryl suture.  The skin was closed with 4-0 Monocryl.        The Baxter catheter was removed. Cystoscopy was performed and revealed intact bladder with bilateral ureteral jets and no evidence of trauma or foreign material. Fluorescein was used during cystoscopy to improve visualization.      The patient was awakened from general anesthesia and taken the recovery room in stable condition.      I was present and scrubbed for the entirety of the surgical procedure. This was procedure was substantially more complicated and required increase time and surgical expertise due to uterine size and location given its proximity to major vasculature and bilateral ureters. Bilateral ureterolysis was required in order to safely complete hysterectomy.      Complications:  None; patient tolerated the procedure well.    Disposition: PACU - hemodynamically stable.  Condition: stable     Aurora Davis  Phone Number: [phone]

## 2025-04-07 NOTE — ANESTHESIA PREPROCEDURE EVALUATION
Patient: Lety Lynn    Procedure Information       Date/Time: 04/07/25 0645    Procedure: Total robotic hysterectomy, bilateral salpingectomy, any indicated procedure (Bilateral: Pelvis)    Location: [facility] OR  / [facility] OR    Surgeons: Aurora Davis MD            Relevant Problems   Pulmonary   (+) Asthma      Neuro   (+) Depression      /Renal   (+) Renal calculus      HEENT   (+) Seasonal allergies      GYN   (+) AMA (advanced maternal age) multigravida 35+ (HHS-HCC)   (+) Fibroid uterus       Clinical information reviewed:    Allergies  Meds     OB Status           NPO Detail:  NPO/Void Status  Date of Last Liquid: 04/06/25  Time of Last Liquid: 2200  Date of Last Solid: 04/06/25  Time of Last Solid: 2000  Last Intake Type: Clear fluids         Physical Exam    Airway  Mallampati: II  TM distance: >3 FB  Neck ROM: full     Cardiovascular - normal exam     Dental - normal exam     Pulmonary - normal exam     Abdominal            Anesthesia Plan    History of general anesthesia?: yes  History of complications of general anesthesia?: no    ASA 2     general     intravenous induction   Anesthetic plan and risks discussed with patient.    Plan discussed with CRNA and attending.

## 2025-04-07 NOTE — ANESTHESIA PROCEDURE NOTES
Airway  Date/Time: 4/7/2025 7:35 AM  Urgency: elective    Airway not difficult    Staffing  Performed: CRNA   Authorized by: Alexander Esquivel MD    Performed by: JERALD Blackburn-SVEN  Patient location during procedure: OR    Indications and Patient Condition  Indications for airway management: anesthesia and airway protection  Spontaneous Ventilation: absent  Sedation level: deep  Preoxygenated: yes  Patient position: sniffing  MILS maintained throughout  Mask difficulty assessment: 1 - vent by mask  Planned trial extubation    Final Airway Details  Final airway type: endotracheal airway      Successful airway: ETT  Cuffed: yes   Successful intubation technique: direct laryngoscopy  Facilitating devices/methods: intubating stylet  Endotracheal tube insertion site: oral  Blade: Rosalia  Blade size: #3  ETT size (mm): 7.0  Cormack-Lehane Classification: grade I - full view of glottis  Placement verified by: chest auscultation and capnometry   Cuff volume (mL): 7  Measured from: lips  ETT to lips (cm): 21  Number of attempts at approach: 1

## 2025-04-07 NOTE — H&P
GYN H&P    Lety Lynn is a 39 y.o. female presenting for total robotic hysterectomy, bilateral salpingectomy, any indicated procedure as definitive management for large anterior fibroid causing significant bulk symptoms.      Lab Results   Component Value Date    HGB 15.6 2024     2024    CREATININE 0.96 2024    HGBA1C 4.9 2024     Imaging:   - MRI pelvis 2024 uterus measuring 12cm x 12cm x 10.5 cm. EMS 4mm with IUD in place. Intramural and exophytic fibroid 12 x 12 x 8 cm in R anterior uterine body. R ovary wnl, L ovary overall normal appearing with 3.5cm simple cyst present.   - Pelvic US 10/2024 showed uterus measuring 12cm x 3cm x 8cm. EMS 1cm. Fibroid noted in JOY, measuring 11cm x 11cm x 10cm (on US 2023, was 9cm x 0.4cm x 6m). Bilateral ovaries WNL. IUD in place.   Labs:   - TSH  WNL     Screening:   - Last pap 2023 negative/negative, no hx of CIN2-3  - Last mammogram 10/2024 BIRADS 1  PMHx: Moderate persistent asthma (well controlled), remote hx of eating disorder in long remission   PSHx: [place] teeth, tonsils + adenoids    POb/Gyn History:  OB History    Para Term  AB Living   7 3 3 0 4 3   SAB IAB Ectopic Multiple Live Births   3 0 1 0 3      # Outcome Date GA Lbr [place]/2nd Weight Sex Type Anes PTL Lv   7 Ectopic            6 SAB            5 SAB            4 SAB            3 Term            2 Term            1 Term                 Social History:  Social History     Socioeconomic History    Marital status:    Tobacco Use    Smoking status: Never    Smokeless tobacco: Never   Substance and Sexual Activity    Alcohol use: Yes     Alcohol/week: 3.0 standard drinks of alcohol     Types: 3 Standard drinks or equivalent per week    Drug use: Never        Family History:  Family History   Problem Relation Name Age of Onset    Hyperlipidemia Mother      Hypertension Mother      Obesity Mother      Other (DEPRESSION AND ANXIETY) Mother       Hyperlipidemia Father      Hypertension Father      Obesity Father      Depression Sister      Obesity Sister      Crohn's disease Brother      Obesity Brother      Other (DEPRESSION AND ANXIETY) Brother      Uterine cancer Paternal Grandmother      Heart disease Paternal Grandfather      Heart attack Paternal Grandfather      Cancer Other aunt     Other (MINISTERIO) Other aunt         maturity onset diabetes mellitus in young    Obesity Other          several family members        Medications:  Current Outpatient Medications   Medication Instructions    budesonide-formoteroL (Symbicort) 80-4.5 mcg/actuation inhaler INHALE 2 PUFFS TWO TIMES A DAY. RINSE MOUTH AFTER USE    cetirizine HCl (ZYRTEC ORAL) As needed    cholecalciferol (VITAMIN D-3) 125 mcg, Once Weekly    levonorgestrel (Mirena) 21 mcg/24 hr (8 yrs) 52 mg IUD 1 each, Once    phentermine-topiramate (Qsymia) 3.75-23 mg capsule, ER multiphase 24 hr 1 capsule, oral, Daily       Allergies:  Patient has no known allergies.     Objective  There were no vitals taken for this visit.    General: no acute distress  HEENT: normocephalic, atraumatic  Heart: warm and well perfused  Lungs: no increased WOB  Abd: soft, nontender  Extremities: moving all extremities  Neuro: awake and conversant  Psych: appropriate mood and affect     Assessment&Plan  Lety Lynn is a 39 y.o. female presenting for total robotic hysterectomy, bilateral salpingectomy, any indicated procedure as definitive management for large anterior fibroid causing significant bulk symptoms.      - Consent signed  - Pre-procedure medications ordered  - Plan for same-day discharge    Seen and d/w Dr. Susan Rao MD PGY2

## 2025-04-16 LAB
LABORATORY COMMENT REPORT: NORMAL
PATH REPORT.FINAL DX SPEC: NORMAL
PATH REPORT.GROSS SPEC: NORMAL
PATH REPORT.RELEVANT HX SPEC: NORMAL
PATH REPORT.TOTAL CANCER: NORMAL

## 2025-05-02 ENCOUNTER — APPOINTMENT (OUTPATIENT)
Dept: OBSTETRICS AND GYNECOLOGY | Facility: CLINIC | Age: 40
End: 2025-05-02
Payer: COMMERCIAL

## 2025-05-08 PROCEDURE — RXMED WILLOW AMBULATORY MEDICATION CHARGE

## 2025-05-09 ENCOUNTER — PHARMACY VISIT (OUTPATIENT)
Dept: PHARMACY | Facility: CLINIC | Age: 40
End: 2025-05-09
Payer: COMMERCIAL

## 2025-05-09 ENCOUNTER — PATIENT MESSAGE (OUTPATIENT)
Dept: OBSTETRICS AND GYNECOLOGY | Facility: CLINIC | Age: 40
End: 2025-05-09
Payer: COMMERCIAL

## 2025-05-14 ENCOUNTER — APPOINTMENT (OUTPATIENT)
Dept: OBSTETRICS AND GYNECOLOGY | Facility: CLINIC | Age: 40
End: 2025-05-14
Payer: COMMERCIAL

## 2025-06-05 ENCOUNTER — OFFICE VISIT (OUTPATIENT)
Dept: OBSTETRICS AND GYNECOLOGY | Facility: CLINIC | Age: 40
End: 2025-06-05
Payer: COMMERCIAL

## 2025-06-05 VITALS
SYSTOLIC BLOOD PRESSURE: 126 MMHG | WEIGHT: 169 LBS | HEART RATE: 71 BPM | BODY MASS INDEX: 31.1 KG/M2 | HEIGHT: 62 IN | DIASTOLIC BLOOD PRESSURE: 75 MMHG

## 2025-06-05 DIAGNOSIS — Z48.89 POSTOPERATIVE VISIT: Primary | ICD-10-CM

## 2025-06-05 PROCEDURE — 99211 OFF/OP EST MAY X REQ PHY/QHP: CPT | Performed by: STUDENT IN AN ORGANIZED HEALTH CARE EDUCATION/TRAINING PROGRAM

## 2025-06-05 PROCEDURE — 3008F BODY MASS INDEX DOCD: CPT | Performed by: STUDENT IN AN ORGANIZED HEALTH CARE EDUCATION/TRAINING PROGRAM

## 2025-06-05 ASSESSMENT — PAIN SCALES - GENERAL: PAINLEVEL_OUTOF10: 0-NO PAIN

## 2025-06-06 NOTE — PROGRESS NOTES
"Division of Minimally Invasive Gynecologic Surgery  [facility]    Date: 6/5/25 - Gynecology Visit    Lety Lynn is a 39 y.o. status post TLH-BS presents for post op check.     Overall recovering well, meeting all milestones.     PMHx, PSHx, SHx, Allergies, and Medications updated in Epic.    ROS: reviewed and negative    PE:/75   Pulse 71   Ht 1.575 m (5' 2\")   Wt 76.7 kg (169 lb)   BMI 30.91 kg/m²    Constitutional:  No acute distress  HEENT: EOM grossly intact, MMM, neck supple and with full ROM  Pulm:  Effort normal. No accessory muscle usage.  No respiratory distress.  :  - EGBUS: grossly WNL  - Speculum: vaginal mucosa grossly WNL, no trauma or lesions, cuff intact w/o laxity   Neurological:  AAO x 3, appropriate to interview  Skin: Warm, no pallor.  Psychiatric:  normal mood and affect.    Assessment/Plan:     Lety Lynn is a 39 y.o. status post TLH-BS presents for post op check.   - Recovering well, no concerns  - Path reviewed, benign  - No indication for further pap smears  - Cleared from a post op perspective       Aurora Davis MD  Division of Minimally Invasive Gynecologic Surgery  [facility]    "

## 2025-06-11 ENCOUNTER — APPOINTMENT (OUTPATIENT)
Dept: OBSTETRICS AND GYNECOLOGY | Facility: CLINIC | Age: 40
End: 2025-06-11
Payer: COMMERCIAL

## 2025-09-03 ENCOUNTER — TELEMEDICINE (OUTPATIENT)
Dept: PRIMARY CARE | Facility: CLINIC | Age: 40
End: 2025-09-03
Payer: COMMERCIAL

## 2025-09-03 DIAGNOSIS — L23.7 POISON IVY: Primary | ICD-10-CM

## 2025-09-03 PROCEDURE — 99213 OFFICE O/P EST LOW 20 MIN: CPT | Performed by: FAMILY MEDICINE

## 2025-09-03 RX ORDER — TRIAMCINOLONE ACETONIDE 1 MG/G
CREAM TOPICAL 2 TIMES DAILY
Qty: 30 G | Refills: 1 | Status: SHIPPED | OUTPATIENT
Start: 2025-09-03 | End: 2025-09-13

## 2025-09-03 RX ORDER — PREDNISONE 10 MG/1
TABLET ORAL
Qty: 20 TABLET | Refills: 0 | Status: SHIPPED | OUTPATIENT
Start: 2025-09-03 | End: 2025-09-15

## (undated) DEVICE — SCISSORS, MONOPOLAR, CURVED, 8MM

## (undated) DEVICE — COVER, TIP HOT SHEARS ENDOWRIST

## (undated) DEVICE — SUTURE, VICRYL, 0, 27 IN, UR-6, VIOLET

## (undated) DEVICE — APPLICATOR, ENDOSCOPIC FLOSEAL

## (undated) DEVICE — SPONGE, LAP, XRAY DECT, 18IN X 18IN, W/LOOP, STERILE

## (undated) DEVICE — GOWN, SURGICAL, SMARTGOWN, XLARGE, STERILE

## (undated) DEVICE — DRESSING, ADHESIVE, ISLAND, TELFA, 2 X 3.75 IN, LF

## (undated) DEVICE — PROTECTOR, NERVE, ULNAR, PINK

## (undated) DEVICE — SPONGE GAUZE, XRAY SC+RFID, 4X4 16 PLY, STERILE

## (undated) DEVICE — DRAPE PACK, LAVH, W/ATTACHED LEGGINGS, W/POUCH, 100 X 114 IN, LF, STERILE

## (undated) DEVICE — ACCESS PORT, 8M M, LOW PROFILE W/BLADELESS OPTICAL TIP, 100MM LENGTH

## (undated) DEVICE — TOWELS 4-PK

## (undated) DEVICE — TUBING SET, TRI-LUMEN, FILTERED, F/AIRSEAL

## (undated) DEVICE — RETRACTOR, CERVICAL CUP, VCARE, STANDARD

## (undated) DEVICE — Device

## (undated) DEVICE — OBTURATOR, BLADELESS , SU

## (undated) DEVICE — PREP TRAY, SKIN, DRY, W/GLOVES

## (undated) DEVICE — DRAPE, PAD, PREP, W/ 9 IN CUFF, 24 X 41, LF, NS

## (undated) DEVICE — DRIVER, NEEDLE, MEGA SUTURE CUT, DAVINCI XI

## (undated) DEVICE — MANIFOLD, 4 PORT NEPTUNE STANDARD

## (undated) DEVICE — DRAPE, COLUMN, DAVINCI XI

## (undated) DEVICE — POSITIONING, THE PINK PAD, PIGAZZI SYSTEM

## (undated) DEVICE — SEALER, VESSEL XI

## (undated) DEVICE — FORCEPS, BIPOLAR FENESTRATED XI

## (undated) DEVICE — SYRINGE, 60 CC, LUER LOCK, MONOJECT

## (undated) DEVICE — SUTURE, V-LOC, 2-0, 6IN, GS-21, GREEN

## (undated) DEVICE — TROCAR SYSTEM, BALLOON, KII GELPORT, 12 X 100MM

## (undated) DEVICE — SEAL, UNIVERSAL, 5-12MM

## (undated) DEVICE — IRRIGATION SET, CYSTOSCOPY, F/CONSTANT/INTERMITTENT, 8 GTT/CC, 77 IN

## (undated) DEVICE — CATHETER, URETHRAL, FOLEY, 2 WAY, 16 FR, 5 CC, SILICONE

## (undated) DEVICE — KIT, ROBOTIC, CUSTOM UHC

## (undated) DEVICE — SUTURE, V-LOC, 0, 12IN, GS-21, GR 180 ABS

## (undated) DEVICE — HEMOSTAT, SURGICEL POWDER 3.0GRAMS

## (undated) DEVICE — SUTURE, MONOCRYL PLUS, 4-0, PS-2 UD 27IN

## (undated) DEVICE — OCCLUDER, COLPO-PNEUMO

## (undated) DEVICE — DRESSING, NON-ADHERENT, TELFA, 3 X 8 IN, NS

## (undated) DEVICE — DRAPE, ARM XI

## (undated) DEVICE — STRIP, SKIN CLOSURE, STERI STRIP, REINFORCED, 0.5 X 4 IN